# Patient Record
Sex: FEMALE | Race: WHITE | NOT HISPANIC OR LATINO | Employment: FULL TIME | URBAN - METROPOLITAN AREA
[De-identification: names, ages, dates, MRNs, and addresses within clinical notes are randomized per-mention and may not be internally consistent; named-entity substitution may affect disease eponyms.]

---

## 2017-05-16 DIAGNOSIS — M62.830 LUMBAR PARASPINAL MUSCLE SPASM: ICD-10-CM

## 2017-05-16 DIAGNOSIS — G47.00 INSOMNIA, UNSPECIFIED TYPE: ICD-10-CM

## 2017-05-16 NOTE — TELEPHONE ENCOUNTER
Cyclobenzaprine 10mg      Last Written Prescription Date: 03/03/2017  #90 x 0  Last filled 03/03/2017  Last Office Visit with [facility] or  Health prescribing provider: 09/20/2016 PERLA Shaikh    Zolpidem 5mg      Last Written Prescription Date:  03/03/2017  #30 x 0  Last filled 03/03/2017  Last Office Visit with [facility], [facility] or [facility] prescribing provider: 09/20/2016 PERLA Shaikh  Future Office visit:   none    Routing refill request to provider for review/approval because:  Drug not on the [facility], [facility] or [facility] refill protocol or controlled substance

## 2017-05-17 RX ORDER — ZOLPIDEM TARTRATE 5 MG/1
TABLET ORAL
Qty: 30 TABLET | Refills: 1 | Status: SHIPPED | OUTPATIENT
Start: 2017-05-17 | End: 2017-10-04

## 2017-05-17 RX ORDER — CYCLOBENZAPRINE HCL 10 MG
TABLET ORAL
Qty: 90 TABLET | Refills: 1 | Status: SHIPPED | OUTPATIENT
Start: 2017-05-17 | End: 2017-11-07

## 2017-09-27 ENCOUNTER — E-VISIT (OUTPATIENT)
Dept: FAMILY MEDICINE | Facility: CLINIC | Age: 47
End: 2017-09-27
Payer: COMMERCIAL

## 2017-09-27 DIAGNOSIS — M54.12 CERVICAL RADICULOPATHY: Primary | ICD-10-CM

## 2017-09-27 PROCEDURE — 99207 ZZC NO CHARGE NURSE ONLY: CPT | Performed by: FAMILY MEDICINE

## 2017-10-04 DIAGNOSIS — G47.00 INSOMNIA, UNSPECIFIED TYPE: ICD-10-CM

## 2017-10-05 RX ORDER — ZOLPIDEM TARTRATE 5 MG/1
TABLET ORAL
Qty: 30 TABLET | Refills: 0 | Status: SHIPPED | OUTPATIENT
Start: 2017-10-05 | End: 2018-01-05

## 2017-10-05 NOTE — TELEPHONE ENCOUNTER
Zolpidem 5mg      Last Written Prescription Date:  05/17/2017 #30 x 1  Last filled 09/02/2017  Last Office Visit with [facility] or  Health prescribing provider: 09/20/2016 PERLA Shaikh  Future Office visit:   none    Routing refill request to provider for review/approval because:  Drug not on the [facility] or  Health refill protocol or controlled substance

## 2017-10-23 ENCOUNTER — OFFICE VISIT (OUTPATIENT)
Dept: FAMILY MEDICINE | Facility: CLINIC | Age: 47
End: 2017-10-23
Payer: COMMERCIAL

## 2017-10-23 VITALS
HEIGHT: 64 IN | HEART RATE: 100 BPM | DIASTOLIC BLOOD PRESSURE: 88 MMHG | SYSTOLIC BLOOD PRESSURE: 124 MMHG | WEIGHT: 200.2 LBS | TEMPERATURE: 98.3 F | BODY MASS INDEX: 34.18 KG/M2

## 2017-10-23 DIAGNOSIS — G89.29 UPPER BACK PAIN, CHRONIC: ICD-10-CM

## 2017-10-23 DIAGNOSIS — R73.09 ABNORMAL GLUCOSE: ICD-10-CM

## 2017-10-23 DIAGNOSIS — M54.9 UPPER BACK PAIN, CHRONIC: ICD-10-CM

## 2017-10-23 DIAGNOSIS — F33.0 MAJOR DEPRESSIVE DISORDER, RECURRENT EPISODE, MILD (H): ICD-10-CM

## 2017-10-23 DIAGNOSIS — E78.5 HYPERLIPIDEMIA LDL GOAL <160: ICD-10-CM

## 2017-10-23 DIAGNOSIS — Z11.3 SCREEN FOR STD (SEXUALLY TRANSMITTED DISEASE): ICD-10-CM

## 2017-10-23 DIAGNOSIS — G56.01 CARPAL TUNNEL SYNDROME OF RIGHT WRIST: Primary | ICD-10-CM

## 2017-10-23 LAB — HBA1C MFR BLD: 5.3 % (ref 4.3–6)

## 2017-10-23 PROCEDURE — 87389 HIV-1 AG W/HIV-1&-2 AB AG IA: CPT | Performed by: FAMILY MEDICINE

## 2017-10-23 PROCEDURE — 99214 OFFICE O/P EST MOD 30 MIN: CPT | Performed by: FAMILY MEDICINE

## 2017-10-23 PROCEDURE — 83036 HEMOGLOBIN GLYCOSYLATED A1C: CPT | Performed by: FAMILY MEDICINE

## 2017-10-23 PROCEDURE — 87491 CHLMYD TRACH DNA AMP PROBE: CPT | Performed by: FAMILY MEDICINE

## 2017-10-23 PROCEDURE — 80053 COMPREHEN METABOLIC PANEL: CPT | Performed by: FAMILY MEDICINE

## 2017-10-23 PROCEDURE — 87591 N.GONORRHOEAE DNA AMP PROB: CPT | Performed by: FAMILY MEDICINE

## 2017-10-23 PROCEDURE — 36415 COLL VENOUS BLD VENIPUNCTURE: CPT | Performed by: FAMILY MEDICINE

## 2017-10-23 PROCEDURE — 80061 LIPID PANEL: CPT | Performed by: FAMILY MEDICINE

## 2017-10-23 PROCEDURE — 86780 TREPONEMA PALLIDUM: CPT | Performed by: FAMILY MEDICINE

## 2017-10-23 RX ORDER — BUPROPION HYDROCHLORIDE 300 MG/1
300 TABLET ORAL DAILY
Qty: 90 TABLET | Refills: 3 | Status: SHIPPED | OUTPATIENT
Start: 2017-10-23 | End: 2018-10-02

## 2017-10-23 ASSESSMENT — PATIENT HEALTH QUESTIONNAIRE - PHQ9: SUM OF ALL RESPONSES TO PHQ QUESTIONS 1-9: 8

## 2017-10-23 NOTE — NURSING NOTE
"Initial /88  Pulse 100  Temp 98.3  F (36.8  C) (Tympanic)  Ht 5' 4.25\" (1.632 m)  Wt 200 lb 3.2 oz (90.8 kg)  LMP 11/15/2013  Breastfeeding? No  BMI 34.1 kg/m2 Estimated body mass index is 34.1 kg/(m^2) as calculated from the following:    Height as of this encounter: 5' 4.25\" (1.632 m).    Weight as of this encounter: 200 lb 3.2 oz (90.8 kg). .      "

## 2017-10-23 NOTE — MR AVS SNAPSHOT
After Visit Summary   10/23/2017    Gloria Mehta    MRN: [id number]           Patient Information     Date Of Birth          1970        Visit Information        Provider Department      10/23/2017 1:00 PM Shirley Shaikh DO [facility]        Today's Diagnoses     Carpal tunnel syndrome of right wrist    -  1    Major depressive disorder, recurrent episode, mild (H)        Upper back pain, chronic        Hyperlipidemia LDL goal <160        Abnormal glucose        Screen for STD (sexually transmitted disease)          Care Instructions    Please call to schedule the EMG to evaluate for carpal tunnel when you are able to.  Based on results we can have you see hand surgery for treatment.    Please also schedule with physical therapy when you are able.              Follow-ups after your visit        Additional Services     [facility] PT, HAND, AND CHIROPRACTIC REFERRAL       **This order will print in the [facility] Scheduling Office**    Physical Therapy, Hand Therapy and Chiropractic Care are available through:    *[facility]  *[facility]  *[facility]    Call one number to schedule at any of the above locations: [phone].    Your provider has referred you to: Physical Therapy at [facility] or [facility]    Indication/Reason for Referral: upper back pain/shoulder  Onset of Illness: chronic  Therapy Orders: Evaluate and Treat  Special Programs: None  Special Request: None    Akanksha Vale      Additional Comments for the Therapist or Chiropractor:     Please be aware that coverage of these services is subject to the terms and limitations of your health insurance plan.  Call member services at your health plan with any benefit or coverage questions.      Please bring the following to your appointment:    *Your personal calendar for scheduling future appointments  *Comfortable clothing            NEUROLOGY ADULT REFERRAL       Your provider has  referred you for the following:   [place] [facility] [phone]   [email or web address]    Please be aware that coverage of these services is subject to the terms and limitations of your health insurance plan.  Call member services at your health plan with any benefit or coverage questions.      Please bring the following with you to your appointment:    (1) Any X-Rays, CTs or MRIs which have been performed.  Contact the facility where they were done to arrange for  prior to your scheduled appointment.    (2) List of current medications  (3) This referral request   (4) Any documents/labs given to you for this referral                  Who to contact     Normal or non-critical lab and imaging results will be communicated to you by [employer], letter or phone within 4 business days after the clinic has received the results. If you do not hear from us within 7 days, please contact the clinic through [employer] or phone. If you have a critical or abnormal lab result, we will notify you by phone as soon as possible.  Submit refill requests through [employer] or call your pharmacy and they will forward the refill request to us. Please allow 3 business days for your refill to be completed.          If you need to speak with a  for additional information , please call: [phone]           Additional Information About Your Visit        [employer] Information     [employer] gives you secure access to your electronic health record. If you see a primary care provider, you can also send messages to your care team and make appointments. If you have questions, please call your primary care clinic.  If you do not have a primary care provider, please call [phone] and they will assist you.        Care EveryWhere ID     This is your Care EveryWhere ID. This could be used by other organizations to access your [facility] medical records  [id number]        Your Vitals Were   "   Pulse Temperature Height Last Period Breastfeeding? BMI (Body Mass Index)    100 98.3  F (36.8  C) (Tympanic) 5' 4.25\" (1.632 m) 11/15/2013 No 34.1 kg/m2       Blood Pressure from Last 3 Encounters:   10/23/17 124/88   09/20/16 114/78   06/11/15 128/78    Weight from Last 3 Encounters:   10/23/17 200 lb 3.2 oz (90.8 kg)   09/20/16 202 lb 9.6 oz (91.9 kg)   06/11/15 193 lb (87.5 kg)              We Performed the Following     Anti Treponema     CHLAMYDIA TRACHOMATIS PCR     Comprehensive metabolic panel     Hemoglobin A1c     HIV Antigen Antibody Combo     [facility] PT, HAND, AND CHIROPRACTIC REFERRAL     Lipid panel reflex to direct LDL     NEISSERIA GONORRHOEA PCR     NEUROLOGY ADULT REFERRAL          Where to get your medicines      These medications were sent to [facility] [address] [place] [address] [address] [facility] [place] [address] [address] [employer] [address]     Phone:  [phone]     buPROPion 300 MG 24 hr tablet          Primary Care Provider Office Phone # Fax #    Shirley Ramonjordon Shaikh,  [phone] [address] [place] [address]        Equal Access to Services     DEXTER REHMAN AH: Hadii niki ku hadasho Soomaali, waaxda luqadaha, qaybta kaalmada adeegyada, waxay sabiin haymileyn juan cheek. So [facility] [phone].    ATENCIÓN: Si habla español, tiene a juan disposición servicios gratuitos de asistencia lingüística. Llame al [phone].    We comply with applicable federal civil rights laws and [place] laws. We do not discriminate on the basis of race, color, national origin, age, disability, sex, sexual orientation, or gender identity.            Thank you!     Thank you for choosing [facility] [place]  for your care. Our goal is always to provide you with excellent care. Hearing back from our patients is one way we can continue to improve our services. Please take a few minutes to complete the written survey that you may " receive in the mail after your visit with us. Thank you!             Your Updated Medication List - Protect others around you: Learn how to safely use, store and throw away your medicines at www.disposemymeds.org.          This list is accurate as of: 10/23/17  1:34 PM.  Always use your most recent med list.                   Brand Name Dispense Instructions for use Diagnosis    buPROPion 300 MG 24 hr tablet    WELLBUTRIN XL    90 tablet    Take 1 tablet (300 mg) by mouth daily    Major depressive disorder, recurrent episode, mild (H)       cyclobenzaprine 10 MG tablet    FLEXERIL    90 tablet    TAKE 1 TABLET(10 MG) BY MOUTH THREE TIMES DAILY AS NEEDED FOR MUSCLE SPASMS    Lumbar paraspinal muscle spasm       DULoxetine 30 MG EC capsule    CYMBALTA    180 capsule    Take 1 capsule (30 mg) by mouth 2 times daily    Tension headache       fluticasone 50 MCG/ACT spray    FLONASE    16 g    One spray each nostril daily    Allergic rhinitis due to pollen       ibuprofen 200 MG tablet    ADVIL/MOTRIN    100 tablet    Take 1-2 tablets (200-400 mg) by mouth every 6 hours as needed for other (mild pain)    Status post hysterectomy       simvastatin 20 MG tablet    ZOCOR    90 tablet    Take 1 tablet (20 mg) by mouth At Bedtime    Hyperlipidemia LDL goal <160       zolpidem 5 MG tablet    AMBIEN    30 tablet    TAKE 1 TABLET BY MOUTH NIGHTLY AS NEEDED FOR SLEEP    Insomnia, unspecified type

## 2017-10-23 NOTE — PROGRESS NOTES
SUBJECTIVE:   Gloria Mehta is a 47 year old female who presents to clinic today for the following health issues:    Musculoskeletal problem/pain      Duration: 1 month    Description  Location: bilateral shoulders and arms    Intensity:  moderate    Accompanying signs and symptoms: radiation of pain to arms and hands, numbness and tingling right hand    History  Previous similar problem: YES- intermittent shoulder pain for the last couple years  Previous evaluation:  none    Precipitating or alleviating factors:  Trauma or overuse: YES- no injury, but she types a lot at work  Aggravating factors include: overuse    Therapies tried and outcome: heat, ice, Ibuprofen and flexeril are not effective, wrist braces unsure if effective    Has pain in both arms and then pain/tingling in the R thumb.  With certain movements tingling can radiate up the arm as well.  This has been going on for a month    Feels tight/tense between her shoulders.  Pain the radiates from there into her arms aquiles, upper and into the forearm as well.  This has been going on intermittently for years.  Long time since her last course of therapy.  Has done massage and chiropractor which have helped    Does do a lot of typing at work.  Has been wearing wrist braces to bed and that is helping.  Does not resolve the tingling when she uses it but makes symptoms more comfortable for sleeping. vcan occasionally get tingling in the 2nd and 3rd digits of the R murray as well.  No symptoms into the L hand.    Had an EMG on the right 16 years ago which was normal at that time    *  requests STD screening today, no particular concerns.    *  Feels mood is stable.  Had run out of her wellbutrin, refilled today.  Struggling some with dating since she  her .  Feels things are going well but it can impact mood.  Overall feels things are stable and she is not interested in making any changes.    * has been taking her simvastatin regularly and was at the  time of her last blood draw.  Not fasting today but would be willing to recheck.        Problem list and histories reviewed & adjusted, as indicated.  Additional history: as documented      Reviewed and updated as needed this visit by clinical staffTobacco  Allergies  Meds  Problems  Med Hx  Surg Hx  Fam Hx  Soc Hx        Reviewed and updated as needed this visit by Provider  Tobacco  Allergies  Meds  Problems  Med Hx  Surg Hx  Fam Hx  Soc Hx        ROS: Remainder of Constitutional, CV, Respiratory, GI,  negative with exception of that mentioned above    PE:  VS as above   Gen:  WN/WD/WH female in NAD   Neck:  supple, no LAD appreciated   MSk:  Full pain free ROM of c-spine.  Full ROM of UE aquiles.  Muscle strength intact in UE aquiles at 5/5, sensation intact to light touch.  DTR's 2/4 at elbows aquiles   Psych: Alert and oriented times 3; coherent speech, normal   rate and volume, able to articulate logical thoughts, able   to abstract reason, no tangential thoughts, no hallucinations   or delusions  Her affect is bright and appropriate    A/P:      ICD-10-CM    1. Carpal tunnel syndrome of right wrist G56.01 NEUROLOGY ADULT REFERRAL   2. Upper back pain, chronic M54.9 [facility] PT, HAND, AND CHIROPRACTIC REFERRAL    G89.29    3. Major depressive disorder, recurrent episode, mild (H) F33.0 buPROPion (WELLBUTRIN XL) 300 MG 24 hr tablet   4. Hyperlipidemia LDL goal <160 E78.5 Lipid panel reflex to direct LDL     Comprehensive metabolic panel   5. Abnormal glucose R73.09 Comprehensive metabolic panel     Hemoglobin A1c   6. Screen for STD (sexually transmitted disease) Z11.3 HIV Antigen Antibody Combo     Anti Treponema     NEISSERIA GONORRHOEA PCR     CHLAMYDIA TRACHOMATIS PCR     Patient Instructions   Please call to schedule the EMG to evaluate for carpal tunnel when you are able to.  Based on results we can have you see hand surgery for treatment.    Please also schedule with physical therapy when you are  able.        meds refilled.  Will notify pt of lab results when available.  If LDL still elevated consider switch to atorvastatin.

## 2017-10-23 NOTE — LETTER
My Depression Action Plan  Name: Gloria Mehta   Date of Birth 1970  Date: 10/23/2017    My doctor: Shirley Shaikh   My clinic: [address] [id number] [phone]          GREEN    ZONE   Good Control    What it looks like:     Things are going generally well. You have normal up s and down s. You may even feel depressed from time to time, but bad moods usually last less than a day.   What you need to do:  1. Continue to care for yourself (see self care plan)  2. Check your depression survival kit and update it as needed  3. Follow your physician s recommendations including any medication.  4. Do not stop taking medication unless you consult with your physician first.           YELLOW         ZONE Getting Worse    What it looks like:     Depression is starting to interfere with your life.     It may be hard to get out of bed; you may be starting to isolate yourself from others.    Symptoms of depression are starting to last most all day and this has happened for several days.     You may have suicidal thoughts but they are not constant.   What you need to do:     1. Call your care team, your response to treatment will improve if you keep your care team informed of your progress. Yellow periods are signs an adjustment may need to be made.     2. Continue your self-care, even if you have to fake it!    3. Talk to someone in your support network    4. Open up your depression survival kit           RED    ZONE Medical Alert - Get Help    What it looks like:     Depression is seriously interfering with your life.     You may experience these or other symptoms: You can t get out of bed most days, can t work or engage in other necessary activities, you have trouble taking care of basic hygiene, or basic responsibilities, thoughts of suicide or death that will not go away, self-injurious behavior.     What you need to do:  1. Call your care team and  request a same-day appointment. If they are not available (weekends or after hours) call your local crisis line, emergency room or 911.      Electronically signed by: Kerrie Mirza, October 23, 2017    Depression Self Care Plan / Survival Kit    Self-Care for Depression  Here s the deal. Your body and mind are really not as separate as most people think.  What you do and think affects how you feel and how you feel influences what you do and think. This means if you do things that people who feel good do, it will help you feel better.  Sometimes this is all it takes.  There is also a place for medication and therapy depending on how severe your depression is, so be sure to consult with your medical provider and/ or Behavioral Health Consultant if your symptoms are worsening or not improving.     In order to better manage my stress, I will:    Exercise  Get some form of exercise, every day. This will help reduce pain and release endorphins, the  feel good  chemicals in your brain. This is almost as good as taking antidepressants!  This is not the same as joining a gym and then never going! (they count on that by the way ) It can be as simple as just going for a walk or doing some gardening, anything that will get you moving.      Hygiene   Maintain good hygiene (Get out of bed in the morning, Make your bed, Brush your teeth, Take a shower, and Get dressed like you were going to work, even if you are unemployed).  If your clothes don't fit try to get ones that do.    Diet  I will strive to eat foods that are good for me, drink plenty of water, and avoid excessive sugar, caffeine, alcohol, and other mood-altering substances.  Some foods that are helpful in depression are: complex carbohydrates, B vitamins, flaxseed, fish or fish oil, fresh fruits and vegetables.    Psychotherapy  I agree to participate in Individual Therapy (if recommended).    Medication  If prescribed medications, I agree to take them.  Missing doses  can result in serious side effects.  I understand that drinking alcohol, or other illicit drug use, may cause potential side effects.  I will not stop my medication abruptly without first discussing it with my provider.    Staying Connected With Others  I will stay in touch with my friends, family members, and my primary care provider/team.    Use your imagination  Be creative.  We all have a creative side; it doesn t matter if it s oil painting, sand castles, or mud pies! This will also kick up the endorphins.    Witness Beauty  (AKA stop and smell the roses) Take a look outside, even in mid-winter. Notice colors, textures. Watch the squirrels and birds.     Service to others  Be of service to others.  There is always someone else in need.  By helping others we can  get out of ourselves  and remember the really important things.  This also provides opportunities for practicing all the other parts of the program.    Humor  Laugh and be silly!  Adjust your TV habits for less news and crime-drama and more comedy.    Control your stress  Try breathing deep, massage therapy, biofeedback, and meditation. Find time to relax each day.     My support system    Clinic Contact:  Phone number:    Contact 1:  Phone number:    Contact 2:  Phone number:    Jain/:  Phone number:    Therapist:  Phone number:    Local crisis center:    Phone number:    Other community support:  Phone number:

## 2017-10-24 ENCOUNTER — MYC MEDICAL ADVICE (OUTPATIENT)
Dept: FAMILY MEDICINE | Facility: CLINIC | Age: 47
End: 2017-10-24

## 2017-10-24 LAB
ALBUMIN SERPL-MCNC: 4.2 G/DL (ref 3.4–5)
ALP SERPL-CCNC: 87 U/L (ref 40–150)
ALT SERPL W P-5'-P-CCNC: 39 U/L (ref 0–50)
ANION GAP SERPL CALCULATED.3IONS-SCNC: 9 MMOL/L (ref 3–14)
AST SERPL W P-5'-P-CCNC: 22 U/L (ref 0–45)
BILIRUB SERPL-MCNC: 0.2 MG/DL (ref 0.2–1.3)
BUN SERPL-MCNC: 14 MG/DL (ref 7–30)
C TRACH DNA SPEC QL NAA+PROBE: NEGATIVE
CALCIUM SERPL-MCNC: 8.9 MG/DL (ref 8.5–10.1)
CHLORIDE SERPL-SCNC: 103 MMOL/L (ref 94–109)
CHOLEST SERPL-MCNC: 243 MG/DL
CO2 SERPL-SCNC: 23 MMOL/L (ref 20–32)
CREAT SERPL-MCNC: 0.75 MG/DL (ref 0.52–1.04)
GFR SERPL CREATININE-BSD FRML MDRD: 83 ML/MIN/1.7M2
GLUCOSE SERPL-MCNC: 99 MG/DL (ref 70–99)
HDLC SERPL-MCNC: 53 MG/DL
HIV 1+2 AB+HIV1 P24 AG SERPL QL IA: NONREACTIVE
LDLC SERPL CALC-MCNC: 148 MG/DL
N GONORRHOEA DNA SPEC QL NAA+PROBE: NEGATIVE
NONHDLC SERPL-MCNC: 190 MG/DL
POTASSIUM SERPL-SCNC: 3.8 MMOL/L (ref 3.4–5.3)
PROT SERPL-MCNC: 8 G/DL (ref 6.8–8.8)
SODIUM SERPL-SCNC: 135 MMOL/L (ref 133–144)
SPECIMEN SOURCE: NORMAL
SPECIMEN SOURCE: NORMAL
T PALLIDUM IGG+IGM SER QL: NEGATIVE
TRIGL SERPL-MCNC: 208 MG/DL

## 2017-10-26 DIAGNOSIS — E78.5 HYPERLIPIDEMIA LDL GOAL <160: Primary | ICD-10-CM

## 2017-10-26 RX ORDER — SIMVASTATIN 40 MG
40 TABLET ORAL AT BEDTIME
Qty: 90 TABLET | Refills: 0 | Status: SHIPPED | OUTPATIENT
Start: 2017-10-26 | End: 2018-10-26

## 2017-11-02 ENCOUNTER — MYC MEDICAL ADVICE (OUTPATIENT)
Dept: FAMILY MEDICINE | Facility: CLINIC | Age: 47
End: 2017-11-02

## 2017-11-02 ENCOUNTER — OFFICE VISIT (OUTPATIENT)
Dept: NEUROLOGY | Facility: CLINIC | Age: 47
End: 2017-11-02
Payer: COMMERCIAL

## 2017-11-02 DIAGNOSIS — G56.01 CARPAL TUNNEL SYNDROME OF RIGHT WRIST: Primary | ICD-10-CM

## 2017-11-02 DIAGNOSIS — R20.2 RIGHT HAND PARESTHESIA: Primary | ICD-10-CM

## 2017-11-02 PROCEDURE — 95910 NRV CNDJ TEST 7-8 STUDIES: CPT | Performed by: PSYCHIATRY & NEUROLOGY

## 2017-11-02 PROCEDURE — 95886 MUSC TEST DONE W/N TEST COMP: CPT | Performed by: PSYCHIATRY & NEUROLOGY

## 2017-11-02 NOTE — MR AVS SNAPSHOT
After Visit Summary   11/2/2017    Gloria Mehta    MRN: [id number]           Patient Information     Date Of Birth          1970        Visit Information        Provider Department      11/2/2017 8:00 AM Maxwell Ch MD [facility]        Today's Diagnoses     Right hand paresthesia    -  1      Care Instructions    Electromyography Test Referral Only    Advised to follow-up with referring provider Shirley Shaikh DO to review the test results and further management.    Thanks                    Follow-ups after your visit        Follow-up notes from your care team     Return if symptoms worsen or fail to improve, for EMG.      Who to contact     If you have questions or need follow up information about today's clinic visit or your schedule please contact [facility] directly at [phone].  Normal or non-critical lab and imaging results will be communicated to you by MyChart, letter or phone within 4 business days after the clinic has received the results. If you do not hear from us within 7 days, please contact the clinic through MyChart or phone. If you have a critical or abnormal lab result, we will notify you by phone as soon as possible.  Submit refill requests through [employer] or call your pharmacy and they will forward the refill request to us. Please allow 3 business days for your refill to be completed.          Additional Information About Your Visit        MyChart Information     [employer] gives you secure access to your electronic health record. If you see a primary care provider, you can also send messages to your care team and make appointments. If you have questions, please call your primary care clinic.  If you do not have a primary care provider, please call [phone] and they will assist you.        Care EveryWhere ID     This is your Care EveryWhere ID. This could be used by other organizations to access your [facility]  "records  [id number]        Your Vitals Were     Height Last Period                1.632 m (5' 4.25\") 11/15/2013           Blood Pressure from Last 3 Encounters:   10/23/17 124/88   09/20/16 114/78   06/11/15 128/78    Weight from Last 3 Encounters:   10/23/17 90.8 kg (200 lb 3.2 oz)   09/20/16 91.9 kg (202 lb 9.6 oz)   06/11/15 87.5 kg (193 lb)              We Performed the Following     HC NCS MOTOR W OR W/O F-WAVE, 7 OR 8     HC NEEDLE EMG EA EXTREMTY W/PARASPINAL AREA COMPLETE        Primary Care Provider Office Phone # Fax #    Shirley Smith Neel,  [phone] [address] [place] [address]        Equal Access to Services     CEZAR REHMAN : Hadii aad sejal hadasho Soomaali, waaxda luqadaha, qaybta kaalmada adeegyada, kai landry haycassie jarrell . So [facility] [phone].    ATENCIÓN: Si habla español, tiene a juan disposición servicios gratuitos de asistencia lingüística. Jeff al [phone].    We comply with applicable federal civil rights laws and [place] laws. We do not discriminate on the basis of race, color, national origin, age, disability, sex, sexual orientation, or gender identity.            Thank you!     Thank you for choosing [facility]  for your care. Our goal is always to provide you with excellent care. Hearing back from our patients is one way we can continue to improve our services. Please take a few minutes to complete the written survey that you may receive in the mail after your visit with us. Thank you!             Your Updated Medication List - Protect others around you: Learn how to safely use, store and throw away your medicines at www.disposemymeds.org.          This list is accurate as of: 11/2/17  9:21 AM.  Always use your most recent med list.                   Brand Name Dispense Instructions for use Diagnosis    buPROPion 300 MG 24 hr tablet    WELLBUTRIN XL    90 tablet    Take 1 tablet (300 mg) by mouth daily    Major " depressive disorder, recurrent episode, mild (H)       cyclobenzaprine 10 MG tablet    FLEXERIL    90 tablet    TAKE 1 TABLET(10 MG) BY MOUTH THREE TIMES DAILY AS NEEDED FOR MUSCLE SPASMS    Lumbar paraspinal muscle spasm       DULoxetine 30 MG EC capsule    CYMBALTA    180 capsule    Take 1 capsule (30 mg) by mouth 2 times daily    Tension headache       fluticasone 50 MCG/ACT spray    FLONASE    16 g    One spray each nostril daily    Allergic rhinitis due to pollen       ibuprofen 200 MG tablet    ADVIL/MOTRIN    100 tablet    Take 1-2 tablets (200-400 mg) by mouth every 6 hours as needed for other (mild pain)    Status post hysterectomy       * simvastatin 20 MG tablet    ZOCOR    90 tablet    Take 1 tablet (20 mg) by mouth At Bedtime    Hyperlipidemia LDL goal <160       * simvastatin 40 MG tablet    ZOCOR    90 tablet    Take 1 tablet (40 mg) by mouth At Bedtime    Hyperlipidemia LDL goal <160       zolpidem 5 MG tablet    AMBIEN    30 tablet    TAKE 1 TABLET BY MOUTH NIGHTLY AS NEEDED FOR SLEEP    Insomnia, unspecified type       * Notice:  This list has 2 medication(s) that are the same as other medications prescribed for you. Read the directions carefully, and ask your doctor or other care provider to review them with you.

## 2017-11-02 NOTE — LETTER
2017         RE: Gloria Mehta  [address]    [facility] [address] [address]        Dear Colleague,    Thank you for referring your patient, Gloria Mehta, to the [facility]. Please see a copy of my visit note below.      ELECTRODIAGNOSTIC LABORATORY REPORT    DATE OF VISIT:  2017  LOCATION: [facility]  MRN: [id number]  NAME: Ms. Gloria Mehta  : 1970 (47 year old)  REFERRING/PRIMARY CARE PROVIDER: Shirley Shaikh DO      REASON FOR TEST: Right hand paresthesias    CLINICAL DATA: 47-year-old woman with the right hand paresthesias of one month duration. Paresthesias predominantly affected digit 1 but she adds that digits 2 and 3 are also affected. These hand paresthesias tend to radiate up to her right elbow. Denies radiating any wrist band or wrist watch on the right side. Denies any history of nocturnal acroparesthesias but she has been using wrist splint. Shaking of the hand does not help with her relieving the paresthesias. Denies any neck pain but complains of stiffness in the posterior neck. No history of cervical spine surgery. No history of diabetes mellitus and/or hypo-thyroidism.  Limited neuromuscular examination essentially normal.    PERTINENT FINDINGS:  Sensory & Mixed Nerve Studies:  1. Right Median-D2 & D3 distal sensory peak latencies normal  2. Right Median versus ulnar mixed nerve palmar studies normal  3. Right Ulnar nerve (digit 5) sensory study normal  4. Right Radial sensory response normal    Motor Nerves:  1. Right Median-APB distal motor latency normal  2. Right Ulnar motor study normal    Limited NEEDLE EMG of Right upper limb muscles normal. The additional muscles were not done because she developed nausea and feeling very hot.    IMPRESSION: Normal study. There is no electrophysiological evidence of focal entrapment neuropathy of right upper limb. In particular, no electrophysiological evidence of median entrapment neuropathy at  right wrist (Carpal tunnel syndrome). No suggestion of cervical radiculopathy based on this limited needle EMG examination.    COMMENTS: A minority of patients can have clinical symptoms suggestive of carpal tunnel syndrome in presence of normal studies. Therefore, if symptoms persist, strong consideration be/will be given to repeat the studies in 4-6 months time or earlier if needed.    Ms. Gloria Mehta plans to follow-up with Shirley Shaikh DO to review test results and plan of management.        Thanks to Shirley Shaikh DO for allowing me to participate in the Ms. Mehta's care. Please feel free to call me with any questions or concerns.     CC: Shirley Shaikh DO                            Again, thank you for allowing me to participate in the care of your patient.        Sincerely,        Maxwell Ch MD

## 2017-11-02 NOTE — PATIENT INSTRUCTIONS
Electromyography Test Referral Only    Advised to follow-up with referring provider Shirley Shaikh DO to review the test results and further management.    Thanks

## 2017-11-02 NOTE — NURSING NOTE
"Chief Complaint   Patient presents with     Procedure     EMG right hand       Initial LMP 11/15/2013 Estimated body mass index is 34.1 kg/(m^2) as calculated from the following:    Height as of 10/23/17: 1.632 m (5' 4.25\").    Weight as of 10/23/17: 90.8 kg (200 lb 3.2 oz).  Medication Reconciliation: unable or not appropriate to perform   Ian Ma MA      "

## 2017-11-02 NOTE — PROGRESS NOTES
ELECTRODIAGNOSTIC LABORATORY REPORT    DATE OF VISIT:  2017  LOCATION: [facility]  MRN: [id number]  NAME: Ms. Gloria Mehta  : 1970 (47 year old)  REFERRING/PRIMARY CARE PROVIDER: Shirley Shaikh DO      REASON FOR TEST: Right hand paresthesias    CLINICAL DATA: 47-year-old woman with the right hand paresthesias of one month duration. Paresthesias predominantly affected digit 1 but she adds that digits 2 and 3 are also affected. These hand paresthesias tend to radiate up to her right elbow. Denies radiating any wrist band or wrist watch on the right side. Denies any history of nocturnal acroparesthesias but she has been using wrist splint. Shaking of the hand does not help with her relieving the paresthesias. Denies any neck pain but complains of stiffness in the posterior neck. No history of cervical spine surgery. No history of diabetes mellitus and/or hypo-thyroidism.  Limited neuromuscular examination essentially normal.    PERTINENT FINDINGS:  Sensory & Mixed Nerve Studies:  1. Right Median-D2 & D3 distal sensory peak latencies normal  2. Right Median versus ulnar mixed nerve palmar studies normal  3. Right Ulnar nerve (digit 5) sensory study normal  4. Right Radial sensory response normal    Motor Nerves:  1. Right Median-APB distal motor latency normal  2. Right Ulnar motor study normal    Limited NEEDLE EMG of Right upper limb muscles normal. The additional muscles were not done because she developed nausea and feeling very hot.    IMPRESSION: Normal study. There is no electrophysiological evidence of focal entrapment neuropathy of right upper limb. In particular, no electrophysiological evidence of median entrapment neuropathy at right wrist (Carpal tunnel syndrome). No suggestion of cervical radiculopathy based on this limited needle EMG examination.    COMMENTS: A minority of patients can have clinical symptoms suggestive of carpal tunnel syndrome in presence of normal studies.  Therefore, if symptoms persist, strong consideration be/will be given to repeat the studies in 4-6 months time or earlier if needed.    Ms. Gloria Mehta plans to follow-up with Shirley Shaikh DO to review test results and plan of management.        Thanks to Shirley Shaikh DO for allowing me to participate in the Ms. Mehta's care. Please feel free to call me with any questions or concerns.     CC: Shirley Shaikh DO

## 2017-11-07 DIAGNOSIS — M62.830 LUMBAR PARASPINAL MUSCLE SPASM: ICD-10-CM

## 2017-11-07 NOTE — TELEPHONE ENCOUNTER
Cyclobenzaprine 10mg      Last Written Prescription Date: 05/17/2017  #90 x 1  Last office 09/02/2017  Last Office Visit with [facility], [facility] or [facility] prescribing provider: 10/23/2017 PERLA Shaikh

## 2017-11-08 NOTE — TELEPHONE ENCOUNTER
Routing refill request to provider for review/approval because:  Drug not on the [facility] refill protocol Cecilia Cohen RN

## 2017-11-09 RX ORDER — CYCLOBENZAPRINE HCL 10 MG
TABLET ORAL
Qty: 90 TABLET | Refills: 0 | Status: SHIPPED | OUTPATIENT
Start: 2017-11-09 | End: 2018-02-21

## 2018-01-05 DIAGNOSIS — G47.00 INSOMNIA, UNSPECIFIED TYPE: ICD-10-CM

## 2018-01-05 RX ORDER — ZOLPIDEM TARTRATE 5 MG/1
TABLET ORAL
Qty: 30 TABLET | Refills: 0 | Status: SHIPPED | OUTPATIENT
Start: 2018-01-05 | End: 2018-02-21

## 2018-01-05 NOTE — TELEPHONE ENCOUNTER
Routing refill request to provider for review/approval because:  Drug not on the [facility] refill protocol     Debbei Zamorano RN

## 2018-01-05 NOTE — TELEPHONE ENCOUNTER
Zolpidem 5mg      Last Written Prescription Date:  10/05/2047 #30 x 0  Last filled 10/05/2017  Last Office Visit: 10/23/2017 PERLA Shaikh  Future Office visit:   none    Routing refill request to provider for review/approval because:  Drug not on the [facility], [facility] or [facility] refill protocol or controlled substance

## 2018-02-21 DIAGNOSIS — M62.830 LUMBAR PARASPINAL MUSCLE SPASM: ICD-10-CM

## 2018-02-21 DIAGNOSIS — G47.00 INSOMNIA, UNSPECIFIED TYPE: ICD-10-CM

## 2018-02-22 RX ORDER — CYCLOBENZAPRINE HCL 10 MG
TABLET ORAL
Qty: 90 TABLET | Refills: 0 | Status: SHIPPED | OUTPATIENT
Start: 2018-02-22 | End: 2018-06-06

## 2018-02-22 RX ORDER — ZOLPIDEM TARTRATE 5 MG/1
TABLET ORAL
Qty: 30 TABLET | Refills: 0 | Status: SHIPPED | OUTPATIENT
Start: 2018-02-22 | End: 2018-06-06

## 2018-02-22 NOTE — TELEPHONE ENCOUNTER
Routing refill request to provider for review/approval because:  Drug not on the [facility] refill protocol     Cristy Murray RN

## 2018-03-15 DIAGNOSIS — E78.5 HYPERLIPIDEMIA LDL GOAL <160: ICD-10-CM

## 2018-03-15 RX ORDER — SIMVASTATIN 20 MG
40 TABLET ORAL AT BEDTIME
Qty: 60 TABLET | Refills: 0 | Status: SHIPPED | OUTPATIENT
Start: 2018-03-15 | End: 2018-04-18

## 2018-03-15 NOTE — TELEPHONE ENCOUNTER
Last Written Prescription Date:  10/26/17  Last Fill Quantity: 90,  # refills: 0   Last office visit: 10/23/2017 with prescribing provider:  Neel   Future Office Visit:

## 2018-03-15 NOTE — TELEPHONE ENCOUNTER
Medication is being filled for 1 time refill only due to:  Patient needs labs lipids. Future labs ordered yes.   Max Quintanilla RN

## 2018-03-27 DIAGNOSIS — J30.1 ALLERGIC RHINITIS DUE TO POLLEN: ICD-10-CM

## 2018-03-27 DIAGNOSIS — G44.209 TENSION HEADACHE: ICD-10-CM

## 2018-03-27 NOTE — TELEPHONE ENCOUNTER
"Requested Prescriptions   Pending Prescriptions Disp Refills     DULoxetine (CYMBALTA) 30 MG EC capsule [Pharmacy Med Name: DULOXETINE DR 30MG CAPSULES] 180 capsule 0    Last Written Prescription Date:  03/03/2017 #180 x 3  Last filled 01/08/2018  Last office visit: 10/23/2017 PERLA Shaikh   Future Office Visit:  None   Sig: TAKE ONE CAPSULE BY MOUTH TWICE DAILY    Serotonin-Norepinephrine Reuptake Inhibitors  Passed    3/27/2018  9:43 AM       Passed - Blood pressure under 140/90 in past 12 months    BP Readings from Last 3 Encounters:   10/23/17 124/88   09/20/16 114/78   06/11/15 128/78                Passed - Recent (12 mo) or future (30 days) visit within the authorizing provider's specialty    Patient had office visit in the last 12 months or has a visit in the next 30 days with authorizing provider or within the authorizing provider's specialty.  See \"Patient Info\" tab in inbasket, or \"Choose Columns\" in Meds & Orders section of the refill encounter.           Passed - Patient is age 18 or older       Passed - No active pregnancy on record       Passed - No positive pregnancy test in past 12 months          "

## 2018-03-27 NOTE — TELEPHONE ENCOUNTER
"Requested Prescriptions   Pending Prescriptions Disp Refills     fluticasone (FLONASE) 50 MCG/ACT spray [Pharmacy Med Name: FLUTICASONE 50MCG RAVIN SP (120SP) RX] 16 mL 0    Last Written Prescription Date:  08/22/201+6 #16g x 11  Last filled 03/01/2017  Last office visit: 10/23/2017 PERLA Shaikh   Future Office Visit:  None   Sig: SHAKE LIQUID AND USE 1 SPRAY IN EACH NOSTRIL DAILY    Inhaled Steroids Protocol Passed    3/27/2018  9:43 AM       Passed - Patient is age 12 or older       Passed - Recent (12 mo) or future (30 days) visit within the authorizing provider's specialty    Patient had office visit in the last 12 months or has a visit in the next 30 days with authorizing provider or within the authorizing provider's specialty.  See \"Patient Info\" tab in inbasket, or \"Choose Columns\" in Meds & Orders section of the refill encounter.              "

## 2018-03-28 RX ORDER — FLUTICASONE PROPIONATE 50 MCG
SPRAY, SUSPENSION (ML) NASAL
Qty: 16 ML | Refills: 2 | Status: SHIPPED | OUTPATIENT
Start: 2018-03-28 | End: 2019-06-26

## 2018-03-28 NOTE — TELEPHONE ENCOUNTER
Prescription approved per [facility] Refill Protocol or patient Primary care provider (PCP)  BRENTON Adame RN/Emil Corona

## 2018-03-30 RX ORDER — DULOXETIN HYDROCHLORIDE 30 MG/1
CAPSULE, DELAYED RELEASE ORAL
Qty: 180 CAPSULE | Refills: 0 | Status: SHIPPED | OUTPATIENT
Start: 2018-03-30 | End: 2018-06-28

## 2018-03-30 NOTE — TELEPHONE ENCOUNTER
Prescription approved per [facility] Refill Protocol.  Cecilia Cohen RN

## 2018-04-18 DIAGNOSIS — E78.5 HYPERLIPIDEMIA LDL GOAL <160: ICD-10-CM

## 2018-04-18 NOTE — TELEPHONE ENCOUNTER
"Requested Prescriptions   Pending Prescriptions Disp Refills     simvastatin (ZOCOR) 20 MG tablet [Pharmacy Med Name: SIMVASTATIN 20MG TABLETS] 60 tablet 0    Last Written Prescription Date:  3/15/18  Last Fill Quantity: 60,  # refills: 0   Last office visit: 10/23/2017 with prescribing provider:  10/23/17   Future Office Visit:     Sig: TAKE TWO TABLETS BY MOUTH AT BEDTIME    Statins Protocol Passed    4/18/2018 12:26 PM       Passed - LDL on file in past 12 months    Recent Labs   Lab Test  10/23/17   1337   LDL  148*            Passed - No abnormal creatine kinase in past 12 months    Recent Labs   Lab Test  10/16/12   0951   CKT  134               Passed - Recent (12 mo) or future (30 days) visit within the authorizing provider's specialty    Patient had office visit in the last 12 months or has a visit in the next 30 days with authorizing provider or within the authorizing provider's specialty.  See \"Patient Info\" tab in inbasket, or \"Choose Columns\" in Meds & Orders section of the refill encounter.           Passed - Patient is age 18 or older       Passed - No active pregnancy on record       Passed - No positive pregnancy test in past 12 months          "

## 2018-04-19 RX ORDER — SIMVASTATIN 20 MG
TABLET ORAL
Qty: 180 TABLET | Refills: 0 | Status: SHIPPED | OUTPATIENT
Start: 2018-04-19 | End: 2018-10-26 | Stop reason: DRUGHIGH

## 2018-04-19 NOTE — TELEPHONE ENCOUNTER
Routing refill request to provider for review/approval because:  Meaghan given x1 and patient did not follow up Cecilia Cohen RN

## 2018-06-06 DIAGNOSIS — M62.830 LUMBAR PARASPINAL MUSCLE SPASM: ICD-10-CM

## 2018-06-06 DIAGNOSIS — G47.00 INSOMNIA, UNSPECIFIED TYPE: ICD-10-CM

## 2018-06-06 NOTE — TELEPHONE ENCOUNTER
Requested Prescriptions   Pending Prescriptions Disp Refills     zolpidem (AMBIEN) 5 MG tablet [Pharmacy Med Name: ZOLPIDEM 5MG TABLETS]  Last Written Prescription Date:  2/22/18  Last Fill Quantity: 30,  # refills: 0   Last office visit: 10/23/2017 with prescribing provider:  10/23/17   Future Office Visit:     30 tablet 0     Sig: TAKE 1 TABLET BY MOUTH EVERY DAY AT BEDTIME AS NEEDED FOR SLEEP    There is no refill protocol information for this order        cyclobenzaprine (FLEXERIL) 10 MG tablet [Pharmacy Med Name: CYCLOBENZAPRINE 10MG TABLETS]  Last Written Prescription Date:  2/22/18  Last Fill Quantity: 90,  # refills: 0   Last office visit: 10/23/2017 with prescribing provider:  10/23/17   Future Office Visit:     90 tablet 0     Sig: TAKE 1 TABLET(10 MG) BY MOUTH THREE TIMES DAILY AS NEEDED FOR MUSCLE SPASMS    There is no refill protocol information for this order

## 2018-06-07 RX ORDER — ZOLPIDEM TARTRATE 5 MG/1
TABLET ORAL
Qty: 30 TABLET | Refills: 0 | Status: SHIPPED | OUTPATIENT
Start: 2018-06-07 | End: 2018-08-02

## 2018-06-07 RX ORDER — CYCLOBENZAPRINE HCL 10 MG
TABLET ORAL
Qty: 90 TABLET | Refills: 0 | Status: SHIPPED | OUTPATIENT
Start: 2018-06-07 | End: 2018-10-02

## 2018-06-28 DIAGNOSIS — G44.209 TENSION HEADACHE: ICD-10-CM

## 2018-06-28 RX ORDER — DULOXETIN HYDROCHLORIDE 30 MG/1
CAPSULE, DELAYED RELEASE ORAL
Qty: 180 CAPSULE | Refills: 0 | Status: SHIPPED | OUTPATIENT
Start: 2018-06-28 | End: 2018-10-02

## 2018-06-28 NOTE — TELEPHONE ENCOUNTER
"Requested Prescriptions   Pending Prescriptions Disp Refills     DULoxetine (CYMBALTA) 30 MG EC capsule [Pharmacy Med Name: DULOXETINE DR 30MG CAPSULES] 180 capsule 0     Sig: TAKE 1 CAPSULE BY MOUTH TWICE DAILY    Serotonin-Norepinephrine Reuptake Inhibitors  Passed    6/28/2018  9:49 AM       Passed - Blood pressure under 140/90 in past 12 months    BP Readings from Last 3 Encounters:   10/23/17 124/88   09/20/16 114/78   06/11/15 128/78                Passed - Recent (12 mo) or future (30 days) visit within the authorizing provider's specialty    Patient had office visit in the last 12 months or has a visit in the next 30 days with authorizing provider or within the authorizing provider's specialty.  See \"Patient Info\" tab in inbasket, or \"Choose Columns\" in Meds & Orders section of the refill encounter.           Passed - Patient is age 18 or older       Passed - No active pregnancy on record       Passed - No positive pregnancy test in past 12 months        Last Written Prescription Date:  3/30/18  Last Fill Quantity: 180,  # refills: 0   Last office visit: 10/23/2017 with prescribing provider:  CAROLYN   Future Office Visit:      "

## 2018-06-28 NOTE — TELEPHONE ENCOUNTER
Prescription approved per [facility] Refill Protocol.  Cecilia Cohen RN

## 2018-08-02 DIAGNOSIS — G47.00 INSOMNIA, UNSPECIFIED TYPE: ICD-10-CM

## 2018-08-02 RX ORDER — ZOLPIDEM TARTRATE 5 MG/1
TABLET ORAL
Qty: 30 TABLET | Refills: 0 | Status: SHIPPED | OUTPATIENT
Start: 2018-08-02 | End: 2018-10-26

## 2018-08-02 NOTE — TELEPHONE ENCOUNTER
Zolpidem 5mg      Last Written Prescription Date:  06/07/2018 #30 x 0  Last filled 06/07/2018  Last Office Visit: 10/23/2017 PERLA Shaikh  Future Office visit:   None    Routing refill request to provider for review/approval because:  Drug not on the [facility], [facility] or [facility] refill protocol or controlled substance

## 2018-10-02 DIAGNOSIS — G44.209 TENSION HEADACHE: ICD-10-CM

## 2018-10-02 DIAGNOSIS — M62.830 LUMBAR PARASPINAL MUSCLE SPASM: ICD-10-CM

## 2018-10-02 DIAGNOSIS — F33.0 MAJOR DEPRESSIVE DISORDER, RECURRENT EPISODE, MILD (H): ICD-10-CM

## 2018-10-02 RX ORDER — DULOXETIN HYDROCHLORIDE 30 MG/1
CAPSULE, DELAYED RELEASE ORAL
Qty: 180 CAPSULE | Refills: 0 | Status: SHIPPED | OUTPATIENT
Start: 2018-10-02 | End: 2018-10-26

## 2018-10-02 RX ORDER — CYCLOBENZAPRINE HCL 10 MG
TABLET ORAL
Qty: 90 TABLET | Refills: 0 | Status: SHIPPED | OUTPATIENT
Start: 2018-10-02 | End: 2019-01-02

## 2018-10-02 RX ORDER — BUPROPION HYDROCHLORIDE 300 MG/1
TABLET ORAL
Qty: 90 TABLET | Refills: 0 | Status: SHIPPED | OUTPATIENT
Start: 2018-10-02 | End: 2018-10-26

## 2018-10-02 NOTE — TELEPHONE ENCOUNTER
Routing refill request to provider for review/approval because:  Due for surveys. Sent to patient via My Chart. Last one done in 2017 was 8. Cecilia Cohen RN

## 2018-10-02 NOTE — TELEPHONE ENCOUNTER
"Requested Prescriptions   Pending Prescriptions Disp Refills     DULoxetine (CYMBALTA) 30 MG EC capsule [Pharmacy Med Name: DULOXETINE DR 30MG CAPSULES] 180 capsule 0    Last Written Prescription Date:  06/28/2018 #180 x 0  Last filled 06/28/2018  Last office visit: 10/23/2017 PERLA Shaikh   Future Office Visit:  None   Sig: TAKE 1 CAPSULE BY MOUTH TWICE DAILY    Serotonin-Norepinephrine Reuptake Inhibitors  Failed    10/2/2018  8:47 AM       Failed - PHQ-9 score of less than 5 in past 6 months    Please review last PHQ-9 score.   PHQ-9 SCORE 6/11/2015 9/2/2016 10/23/2017   Total Score 8 - -   Total Score MyChart - 7 (Mild depression) -   Total Score - - 8       NORMAN-7 SCORE 12/18/2014   Total Score 4                Failed - Recent (6 mo) or future (30 days) visit within the authorizing provider's specialty    Patient had office visit in the last 6 months or has a visit in the next 30 days with authorizing provider or within the authorizing provider's specialty.  See \"Patient Info\" tab in inbasket, or \"Choose Columns\" in Meds & Orders section of the refill encounter.           Passed - Blood pressure under 140/90 in past 12 months    BP Readings from Last 3 Encounters:   10/23/17 124/88   09/20/16 114/78   06/11/15 128/78                Passed - Patient is age 18 or older       Passed - No active pregnancy on record       Passed - No positive pregnancy test in past 12 months        buPROPion (WELLBUTRIN XL) 300 MG 24 hr tablet [Pharmacy Med Name: BUPROPION XL 300MG TABLETS] 90 tablet 0    Last Written Prescription Date:  10/23/2017 #90 x 3  Last filled 06/28/2018  Last office visit: 10/23/2017 PERLA Shaikh   Future Office Visit: None   Sig: TAKE 1 TABLET(300 MG) BY MOUTH DAILY    SSRIs Protocol Failed    10/2/2018  8:47 AM       Failed - PHQ-9 score less than 5 in past 6 months    Please review last PHQ-9 score.   PHQ-9 SCORE 6/11/2015 9/2/2016 10/23/2017   Total Score 8 - -   Total Score MyChart - 7 (Mild depression) - " "  Total Score - - 8       NORMAN-7 SCORE 12/18/2014   Total Score 4                Failed - Recent (6 mo) or future (30 days) visit within the authorizing provider's specialty    Patient had office visit in the last 6 months or has a visit in the next 30 days with authorizing provider or within the authorizing provider's specialty.  See \"Patient Info\" tab in inbasket, or \"Choose Columns\" in Meds & Orders section of the refill encounter.           Passed - Medication is Bupropion    If the medication is Bupropion (Wellbutrin), and the patient is taking for smoking cessation; OK to refill.         Passed - Patient is age 18 or older       Passed - No active pregnancy on record       Passed - No positive pregnancy test in last 12 months          "

## 2018-10-02 NOTE — TELEPHONE ENCOUNTER
Routing refill request to provider for review/approval because:  Patient needs to be seen because:  Last OV 10/23/17  [employer] message sent to pt to schedule OV.    Zoila BRUNO RN

## 2018-10-02 NOTE — TELEPHONE ENCOUNTER
Requested Prescriptions   Pending Prescriptions Disp Refills     cyclobenzaprine (FLEXERIL) 10 MG tablet [Pharmacy Med Name: CYCLOBENZAPRINE 10MG TABLETS]  Last Written Prescription Date:  06/07/2018 #90 x 0  Last filled 06/07/2018  Last office visit: 10/23/2017 PERLA Shaikh  Future Office Visit:  None   90 tablet 0     Sig: TAKE 1 TABLET(10 MG) BY MOUTH THREE TIMES DAILY AS NEEDED FOR MUSCLE SPASMS    There is no refill protocol information for this order

## 2018-10-26 ENCOUNTER — OFFICE VISIT (OUTPATIENT)
Dept: FAMILY MEDICINE | Facility: CLINIC | Age: 48
End: 2018-10-26
Payer: COMMERCIAL

## 2018-10-26 VITALS
HEART RATE: 112 BPM | WEIGHT: 193 LBS | TEMPERATURE: 98.8 F | DIASTOLIC BLOOD PRESSURE: 88 MMHG | SYSTOLIC BLOOD PRESSURE: 130 MMHG | BODY MASS INDEX: 32.15 KG/M2 | HEIGHT: 65 IN

## 2018-10-26 DIAGNOSIS — Z00.00 ENCOUNTER FOR ROUTINE ADULT HEALTH EXAMINATION WITHOUT ABNORMAL FINDINGS: Primary | ICD-10-CM

## 2018-10-26 DIAGNOSIS — Z12.31 ENCOUNTER FOR SCREENING MAMMOGRAM FOR BREAST CANCER: ICD-10-CM

## 2018-10-26 DIAGNOSIS — Z86.32 HISTORY OF GESTATIONAL DIABETES: ICD-10-CM

## 2018-10-26 DIAGNOSIS — G44.209 TENSION HEADACHE: ICD-10-CM

## 2018-10-26 DIAGNOSIS — E78.5 HYPERLIPIDEMIA LDL GOAL <160: ICD-10-CM

## 2018-10-26 DIAGNOSIS — F33.0 MAJOR DEPRESSIVE DISORDER, RECURRENT EPISODE, MILD (H): ICD-10-CM

## 2018-10-26 DIAGNOSIS — Z11.3 SCREEN FOR STD (SEXUALLY TRANSMITTED DISEASE): ICD-10-CM

## 2018-10-26 DIAGNOSIS — G47.00 INSOMNIA, UNSPECIFIED TYPE: ICD-10-CM

## 2018-10-26 LAB
ALBUMIN SERPL-MCNC: 3.9 G/DL (ref 3.4–5)
ALP SERPL-CCNC: 76 U/L (ref 40–150)
ALT SERPL W P-5'-P-CCNC: 30 U/L (ref 0–50)
ANION GAP SERPL CALCULATED.3IONS-SCNC: 6 MMOL/L (ref 3–14)
AST SERPL W P-5'-P-CCNC: 21 U/L (ref 0–45)
BILIRUB SERPL-MCNC: 0.2 MG/DL (ref 0.2–1.3)
BUN SERPL-MCNC: 17 MG/DL (ref 7–30)
CALCIUM SERPL-MCNC: 8.7 MG/DL (ref 8.5–10.1)
CHLORIDE SERPL-SCNC: 104 MMOL/L (ref 94–109)
CHOLEST SERPL-MCNC: 234 MG/DL
CO2 SERPL-SCNC: 27 MMOL/L (ref 20–32)
CREAT SERPL-MCNC: 0.72 MG/DL (ref 0.52–1.04)
GFR SERPL CREATININE-BSD FRML MDRD: 87 ML/MIN/1.7M2
GLUCOSE SERPL-MCNC: 97 MG/DL (ref 70–99)
HBA1C MFR BLD: 5.4 % (ref 0–5.6)
HDLC SERPL-MCNC: 61 MG/DL
LDLC SERPL CALC-MCNC: 140 MG/DL
NONHDLC SERPL-MCNC: 173 MG/DL
POTASSIUM SERPL-SCNC: 3.9 MMOL/L (ref 3.4–5.3)
PROT SERPL-MCNC: 7.7 G/DL (ref 6.8–8.8)
SODIUM SERPL-SCNC: 137 MMOL/L (ref 133–144)
TRIGL SERPL-MCNC: 164 MG/DL

## 2018-10-26 PROCEDURE — 86780 TREPONEMA PALLIDUM: CPT | Performed by: FAMILY MEDICINE

## 2018-10-26 PROCEDURE — 87389 HIV-1 AG W/HIV-1&-2 AB AG IA: CPT | Performed by: FAMILY MEDICINE

## 2018-10-26 PROCEDURE — 83036 HEMOGLOBIN GLYCOSYLATED A1C: CPT | Performed by: FAMILY MEDICINE

## 2018-10-26 PROCEDURE — 87591 N.GONORRHOEAE DNA AMP PROB: CPT | Performed by: FAMILY MEDICINE

## 2018-10-26 PROCEDURE — 80053 COMPREHEN METABOLIC PANEL: CPT | Performed by: FAMILY MEDICINE

## 2018-10-26 PROCEDURE — 87491 CHLMYD TRACH DNA AMP PROBE: CPT | Performed by: FAMILY MEDICINE

## 2018-10-26 PROCEDURE — 80061 LIPID PANEL: CPT | Performed by: FAMILY MEDICINE

## 2018-10-26 PROCEDURE — 36415 COLL VENOUS BLD VENIPUNCTURE: CPT | Performed by: FAMILY MEDICINE

## 2018-10-26 PROCEDURE — 99396 PREV VISIT EST AGE 40-64: CPT | Performed by: FAMILY MEDICINE

## 2018-10-26 RX ORDER — ZOLPIDEM TARTRATE 5 MG/1
TABLET ORAL
Qty: 30 TABLET | Refills: 0 | Status: SHIPPED | OUTPATIENT
Start: 2018-10-26 | End: 2019-01-02

## 2018-10-26 RX ORDER — BUPROPION HYDROCHLORIDE 300 MG/1
TABLET ORAL
Qty: 90 TABLET | Refills: 1 | Status: SHIPPED | OUTPATIENT
Start: 2018-10-26 | End: 2019-09-09

## 2018-10-26 RX ORDER — DULOXETIN HYDROCHLORIDE 30 MG/1
CAPSULE, DELAYED RELEASE ORAL
Qty: 180 CAPSULE | Refills: 1 | Status: SHIPPED | OUTPATIENT
Start: 2018-10-26 | End: 2019-06-26

## 2018-10-26 RX ORDER — SIMVASTATIN 40 MG
40 TABLET ORAL AT BEDTIME
Qty: 90 TABLET | Refills: 3 | Status: SHIPPED | OUTPATIENT
Start: 2018-10-26 | End: 2019-10-03

## 2018-10-26 NOTE — NURSING NOTE
"Initial /88  Pulse 112  Temp 98.8  F (37.1  C) (Tympanic)  Ht 5' 4.75\" (1.645 m)  Wt 193 lb (87.5 kg)  LMP 11/15/2013  Breastfeeding? No  BMI 32.37 kg/m2 Estimated body mass index is 32.37 kg/(m^2) as calculated from the following:    Height as of this encounter: 5' 4.75\" (1.645 m).    Weight as of this encounter: 193 lb (87.5 kg). .      "

## 2018-10-26 NOTE — PROGRESS NOTES
SUBJECTIVE:   CC: Gloria Mehta is an 48 year old woman who presents for preventive health visit.     Healthy Habits:    Do you get at least three servings of calcium containing foods daily (dairy, green leafy vegetables, etc.)? yes    Amount of exercise or daily activities, outside of work: 4-5 day(s) per week - walking    Problems taking medications regularly No    Medication side effects: No    Have you had an eye exam in the past two years? no    Do you see a dentist twice per year? yes    Do you have sleep apnea, excessive snoring or daytime drowsiness?no      No concerns    Feels things are going very well.  Mood is great.  No concerns    Today's PHQ-2 Score:   PHQ-2 ( 1999 Pf[employer]) 10/26/2018 9/20/2016   Q1: Little interest or pleasure in doing things 0 0   Q2: Feeling down, depressed or hopeless 1 1   PHQ-2 Score 1 1   Q1: Little interest or pleasure in doing things - -   Q2: Feeling down, depressed or hopeless - -   PHQ-2 Score - -       Abuse: Current or Past(Physical, Sexual or Emotional)- No  Do you feel safe in your environment - Yes    Social History   Substance Use Topics     Smoking status: Former Smoker     Packs/day: 0.10     Years: 2.00     Types: Cigarettes     Quit date: 12/23/2014     Smokeless tobacco: Former User     Quit date: 11/4/2013      Comment: 2 cigs daily     Alcohol use Yes      Comment: a beer per month     If you drink alcohol do you typically have >3 drinks per day or >7 drinks per week? No                     Reviewed orders with patient.  Reviewed health maintenance and updated orders accordingly - Yes    Patient under age 50, mutual decision reflected in health maintenance.      Pertinent mammograms are reviewed under the imaging tab.  History of abnormal Pap smear: Status post benign hysterectomy. Health Maintenance and Surgical History updated.  PAP / HPV 10/8/2013 10/26/2010 9/21/2009   PAP NIL NIL NIL     Reviewed and updated as needed this visit by clinical  "staff  Tobacco  Allergies  Meds  Med Hx  Surg Hx  Fam Hx  Soc Hx        Reviewed and updated as needed this visit by Provider  Tobacco  Med Hx  Surg Hx  Fam Hx  Soc Hx       History reviewed. No pertinent past medical history.   Past Surgical History:   Procedure Laterality Date     C  DELIVERY ONLY  , , 10/2001, 2008     CYSTOSCOPY  2013    Procedure: CYSTOSCOPY;;  Surgeon: Dave Jones MD;  Location: [facility]     LAPAROSCOPIC HYSTERECTOMY TOTAL, BILATERAL SALPINGO-OOPHORECTOMY, COMBINED  2013    Procedure: COMBINED LAPAROSCOPIC HYSTERECTOMY TOTAL, SALPINGO-OOPHORECTOMY;  Laparoscopic Total Hysterectomy,Bilateral Salpingectomy with Sparing of the Ovaries;  Surgeon: Dave Jones MD;  Location: [facility]     TUBAL LIGATION  10/2001       ROS:  CONSTITUTIONAL: NEGATIVE for fever, chills, change in weight  INTEGUMENTARY/SKIN: NEGATIVE for worrisome rashes, moles or lesions  EYES: NEGATIVE for vision changes or irritation  ENT: NEGATIVE for ear, mouth and throat problems  RESP: NEGATIVE for significant cough or SOB  BREAST: NEGATIVE for masses, tenderness or discharge  CV: NEGATIVE for chest pain, palpitations or peripheral edema  GI: NEGATIVE for nausea, abdominal pain, heartburn, or change in bowel habits  : NEGATIVE for unusual urinary or vaginal symptoms. No vaginal bleeding.  MUSCULOSKELETAL: NEGATIVE for significant arthralgias or myalgia  NEURO: NEGATIVE for weakness, dizziness or paresthesias  PSYCHIATRIC: NEGATIVE for changes in mood or affect     OBJECTIVE:   /88  Pulse 112  Temp 98.8  F (37.1  C) (Tympanic)  Ht 5' 4.75\" (1.645 m)  Wt 193 lb (87.5 kg)  LMP 11/15/2013  Breastfeeding? No  BMI 32.37 kg/m2  EXAM:  GENERAL APPEARANCE: healthy, alert and no distress  EYES: Eyes grossly normal to inspection, PERRL and conjunctivae and sclerae normal  HENT: ear canals and TM's normal, nose and mouth without ulcers or lesions, oropharynx clear and oral " mucous membranes moist  NECK: no adenopathy, no asymmetry, masses, or scars and thyroid normal to palpation  RESP: lungs clear to auscultation - no rales, rhonchi or wheezes  BREAST: normal without masses, tenderness or nipple discharge and no palpable axillary masses or adenopathy  CV: regular rate and rhythm, normal S1 S2, no S3 or S4, no murmur, click or rub, no peripheral edema and peripheral pulses strong  ABDOMEN: soft, nontender, no hepatosplenomegaly, no masses and bowel sounds normal  MS: no musculoskeletal defects are noted and gait is age appropriate without ataxia  NEURO: Normal strength and tone, sensory exam grossly normal, mentation intact and speech normal  PSYCH: mentation appears normal and affect normal/bright    Diagnostic Test Results:  none     ASSESSMENT/PLAN:       ICD-10-CM    1. Encounter for routine adult health examination without abnormal findings Z00.00    2. Hyperlipidemia LDL goal <160 E78.5 simvastatin (ZOCOR) 40 MG tablet     Comprehensive metabolic panel     Lipid panel reflex to direct LDL Fasting   3. Insomnia, unspecified type G47.00 zolpidem (AMBIEN) 5 MG tablet   4. Tension headache G44.209 DULoxetine (CYMBALTA) 30 MG EC capsule   5. Major depressive disorder, recurrent episode, mild (H) F33.0 buPROPion (WELLBUTRIN XL) 300 MG 24 hr tablet   6. History of gestational diabetes Z86.32 Comprehensive metabolic panel     Hemoglobin A1c   7. Encounter for screening mammogram for breast cancer Z12.31 MA Screening Digital Bilateral   8. Screen for STD (sexually transmitted disease) Z11.3 NEISSERIA GONORRHOEA PCR     CHLAMYDIA TRACHOMATIS PCR     HIV Antigen Antibody Combo     Treponema Abs w Reflex to RPR and Titer       COUNSELING:   Reviewed preventive health counseling, as reflected in patient instructions  Special attention given to:        Regular exercise       Healthy diet/nutrition       Osteoporosis Prevention/Bone Health       Safe sex practices/STD prevention       Colon  "cancer screening       (Kathe)menopause management    BP Readings from Last 1 Encounters:   10/26/18 130/88     Estimated body mass index is 32.37 kg/(m^2) as calculated from the following:    Height as of this encounter: 5' 4.75\" (1.645 m).    Weight as of this encounter: 193 lb (87.5 kg).    BP Screening:   Last 3 BP Readings:    BP Readings from Last 3 Encounters:   10/26/18 130/88   10/23/17 124/88   09/20/16 114/78       The following was recommended to the patient:  Re-screen BP within a year and recommended lifestyle modifications  Weight management plan: Discussed healthy diet and exercise guidelines and patient will follow up in 12 months in clinic to re-evaluate.     reports that she quit smoking about 3 years ago. Her smoking use included Cigarettes. She has a 0.20 pack-year smoking history. She quit smokeless tobacco use about 5 years ago.      Counseling Resources:  ATP IV Guidelines  Pooled Cohorts Equation Calculator  Breast Cancer Risk Calculator  FRAX Risk Assessment  ICSI Preventive Guidelines  Dietary Guidelines for Americans, 2010  [employer]'s MyPlate  ASA Prophylaxis  Lung CA Screening    Shirley Shaikh, DO  [facility]    Patient Instructions     Preventive Health Recommendations  Female Ages 40 to 49    Yearly exam:     See your health care provider every year in order to  1. Review health changes.   2. Discuss preventive care.    3. Review your medicines if your doctor prescribed any.      Get a Pap test every three years (unless you have an abnormal result and your provider advises testing more often).      If you get Pap tests with HPV test, you only need to test every 5 years, unless you have an abnormal result. You do not need a Pap test if your uterus was removed (hysterectomy) and you have not had cancer.      You should be tested each year for STDs (sexually transmitted diseases), if you're at risk.     Ask your doctor if you should have a mammogram.      Have a colonoscopy " (test for colon cancer) if someone in your family has had colon cancer or polyps before age 50.       Have a cholesterol test every 5 years.       Have a diabetes test (fasting glucose) after age 45. If you are at risk for diabetes, you should have this test every 3 years.    Shots: Get a flu shot each year. Get a tetanus shot every 10 years.     Nutrition:     Eat at least 5 servings of fruits and vegetables each day.    Eat whole-grain bread, whole-wheat pasta and brown rice instead of white grains and rice.    Get adequate Calcium and Vitamin D.      Lifestyle    Exercise at least 150 minutes a week (an average of 30 minutes a day, 5 days a week). This will help you control your weight and prevent disease.    Limit alcohol to one drink per day.    No smoking.     Wear sunscreen to prevent skin cancer.    See your dentist every six months for an exam and cleaning.

## 2018-10-26 NOTE — MR AVS SNAPSHOT
After Visit Summary   10/26/2018    Gloria Mehta    MRN: [id number]           Patient Information     Date Of Birth          1970        Visit Information        Provider Department      10/26/2018 8:40 AM Shirley Shaikh DO [facility]        Today's Diagnoses     Encounter for routine adult health examination without abnormal findings    -  1    Encounter for screening mammogram for breast cancer        Hyperlipidemia LDL goal <160        Insomnia, unspecified type        Tension headache        Major depressive disorder, recurrent episode, mild (H)        History of gestational diabetes        Screen for STD (sexually transmitted disease)          Care Instructions      Preventive Health Recommendations  Female Ages 40 to 49    Yearly exam:     See your health care provider every year in order to  1. Review health changes.   2. Discuss preventive care.    3. Review your medicines if your doctor prescribed any.      Get a Pap test every three years (unless you have an abnormal result and your provider advises testing more often).      If you get Pap tests with HPV test, you only need to test every 5 years, unless you have an abnormal result. You do not need a Pap test if your uterus was removed (hysterectomy) and you have not had cancer.      You should be tested each year for STDs (sexually transmitted diseases), if you're at risk.     Ask your doctor if you should have a mammogram.      Have a colonoscopy (test for colon cancer) if someone in your family has had colon cancer or polyps before age 50.       Have a cholesterol test every 5 years.       Have a diabetes test (fasting glucose) after age 45. If you are at risk for diabetes, you should have this test every 3 years.    Shots: Get a flu shot each year. Get a tetanus shot every 10 years.     Nutrition:     Eat at least 5 servings of fruits and vegetables each day.    Eat whole-grain bread, whole-wheat pasta and brown  rice instead of white grains and rice.    Get adequate Calcium and Vitamin D.      Lifestyle    Exercise at least 150 minutes a week (an average of 30 minutes a day, 5 days a week). This will help you control your weight and prevent disease.    Limit alcohol to one drink per day.    No smoking.     Wear sunscreen to prevent skin cancer.    See your dentist every six months for an exam and cleaning.          Follow-ups after your visit        Future tests that were ordered for you today     Open Future Orders        Priority Expected Expires Ordered    MA Screening Digital Bilateral Routine  10/26/2019 10/26/2018            Who to contact     Normal or non-critical lab and imaging results will be communicated to you by Clinicient[facility], letter or phone within 4 business days after the clinic has received the results. If you do not hear from us within 7 days, please contact the clinic through [employer] or phone. If you have a critical or abnormal lab result, we will notify you by phone as soon as possible.  Submit refill requests through [employer] or call your pharmacy and they will forward the refill request to us. Please allow 3 business days for your refill to be completed.          If you need to speak with a  for additional information , please call: [phone]           Additional Information About Your Visit        BookBub Information     [employer] gives you secure access to your electronic health record. If you see a primary care provider, you can also send messages to your care team and make appointments. If you have questions, please call your primary care clinic.  If you do not have a primary care provider, please call [phone] and they will assist you.        Care EveryWhere ID     This is your Care EveryWhere ID. This could be used by other organizations to access your [facility] medical records  [id number]        Your Vitals Were     Pulse Temperature Height Last Period Breastfeeding? BMI (Body  "Mass Index)    112 98.8  F (37.1  C) (Tympanic) 5' 4.75\" (1.645 m) 11/15/2013 No 32.37 kg/m2       Blood Pressure from Last 3 Encounters:   10/26/18 130/88   10/23/17 124/88   09/20/16 114/78    Weight from Last 3 Encounters:   10/26/18 193 lb (87.5 kg)   10/23/17 200 lb 3.2 oz (90.8 kg)   09/20/16 202 lb 9.6 oz (91.9 kg)              We Performed the Following     CHLAMYDIA TRACHOMATIS PCR     Comprehensive metabolic panel     DEPRESSION ACTION PLAN (DAP)     Hemoglobin A1c     HIV Antigen Antibody Combo     Lipid panel reflex to direct LDL Fasting     NEISSERIA GONORRHOEA PCR     Treponema Abs w Reflex to RPR and Titer          Today's Medication Changes          These changes are accurate as of 10/26/18  9:18 AM.  If you have any questions, ask your nurse or doctor.               These medicines have changed or have updated prescriptions.        Dose/Directions    simvastatin 40 MG tablet   Commonly known as:  ZOCOR   This may have changed:  Another medication with the same name was removed. Continue taking this medication, and follow the directions you see here.   Used for:  Hyperlipidemia LDL goal <160   Changed by:  Shirley Shaikh DO        Dose:  40 mg   Take 1 tablet (40 mg) by mouth At Bedtime   Quantity:  90 tablet   Refills:  3            Where to get your medicines      These medications were sent to [id number] - [place], [place] - [address], [place] [address]     Phone:  [phone]     buPROPion 300 MG 24 hr tablet    DULoxetine 30 MG EC capsule    simvastatin 40 MG tablet         Some of these will need a paper prescription and others can be bought over the counter.  Ask your nurse if you have questions.     Bring a paper prescription for each of these medications     zolpidem 5 MG tablet                Primary Care Provider Office Phone # Fax #    Shirley Shaikh [phone] [address] [place] [address]        Equal Access to Services     [facility] Hospital[facility] : Hadii aad ku hadmarcelsean Brown, wastefda brandon, qakai islas. So [facility] [phone].    ATENCIÓN: Si habla español, tiene a juan disposición servicios gratuitos de asistencia lingüística. [facility] [phone].    We comply with applicable federal civil rights laws and [place] laws. We do not discriminate on the basis of race, color, national origin, age, disability, sex, sexual orientation, or gender identity.            Thank you!     Thank you for choosing [facility] [place]  for your care. Our goal is always to provide you with excellent care. Hearing back from our patients is one way we can continue to improve our services. Please take a few minutes to complete the written survey that you may receive in the mail after your visit with us. Thank you!             Your Updated Medication List - Protect others around you: Learn how to safely use, store and throw away your medicines at www.disposemymeds.org.          This list is accurate as of 10/26/18  9:18 AM.  Always use your most recent med list.                   Brand Name Dispense Instructions for use Diagnosis    buPROPion 300 MG 24 hr tablet    WELLBUTRIN XL    90 tablet    TAKE 1 TABLET(300 MG) BY MOUTH DAILY    Major depressive disorder, recurrent episode, mild (H)       cyclobenzaprine 10 MG tablet    FLEXERIL    90 tablet    TAKE 1 TABLET(10 MG) BY MOUTH THREE TIMES DAILY AS NEEDED FOR MUSCLE SPASMS    Lumbar paraspinal muscle spasm       DULoxetine 30 MG EC capsule    CYMBALTA    180 capsule    TAKE 1 CAPSULE BY MOUTH TWICE DAILY    Tension headache       fluticasone 50 MCG/ACT spray    FLONASE    16 mL    SHAKE LIQUID AND USE 1 SPRAY IN EACH NOSTRIL DAILY    Allergic rhinitis due to pollen       ibuprofen 200 MG tablet    ADVIL/MOTRIN    100 tablet    Take 1-2 tablets (200-400 mg) by mouth every 6 hours as needed  for other (mild pain)    Status post hysterectomy       simvastatin 40 MG tablet    ZOCOR    90 tablet    Take 1 tablet (40 mg) by mouth At Bedtime    Hyperlipidemia LDL goal <160       zolpidem 5 MG tablet    AMBIEN    30 tablet    TAKE 1 TABLET BY MOUTH EVERY DAY AT BEDTIME AS NEEDED FOR SLEEP.    Insomnia, unspecified type

## 2018-10-26 NOTE — LETTER
My Depression Action Plan  Name: Gloria Mehta   Date of Birth 1970  Date: 10/26/2018    My doctor: Shirley Shaikh   My clinic: [address] [id number] [phone]          GREEN    ZONE   Good Control    What it looks like:     Things are going generally well. You have normal up s and down s. You may even feel depressed from time to time, but bad moods usually last less than a day.   What you need to do:  1. Continue to care for yourself (see self care plan)  2. Check your depression survival kit and update it as needed  3. Follow your physician s recommendations including any medication.  4. Do not stop taking medication unless you consult with your physician first.           YELLOW         ZONE Getting Worse    What it looks like:     Depression is starting to interfere with your life.     It may be hard to get out of bed; you may be starting to isolate yourself from others.    Symptoms of depression are starting to last most all day and this has happened for several days.     You may have suicidal thoughts but they are not constant.   What you need to do:     1. Call your care team, your response to treatment will improve if you keep your care team informed of your progress. Yellow periods are signs an adjustment may need to be made.     2. Continue your self-care, even if you have to fake it!    3. Talk to someone in your support network    4. Open up your depression survival kit           RED    ZONE Medical Alert - Get Help    What it looks like:     Depression is seriously interfering with your life.     You may experience these or other symptoms: You can t get out of bed most days, can t work or engage in other necessary activities, you have trouble taking care of basic hygiene, or basic responsibilities, thoughts of suicide or death that will not go away, self-injurious behavior.     What you need to do:  1. Call your care team and  request a same-day appointment. If they are not available (weekends or after hours) call your local crisis line, emergency room or 911.            Depression Self Care Plan / Survival Kit    Self-Care for Depression  Here s the deal. Your body and mind are really not as separate as most people think.  What you do and think affects how you feel and how you feel influences what you do and think. This means if you do things that people who feel good do, it will help you feel better.  Sometimes this is all it takes.  There is also a place for medication and therapy depending on how severe your depression is, so be sure to consult with your medical provider and/ or Behavioral Health Consultant if your symptoms are worsening or not improving.     In order to better manage my stress, I will:    Exercise  Get some form of exercise, every day. This will help reduce pain and release endorphins, the  feel good  chemicals in your brain. This is almost as good as taking antidepressants!  This is not the same as joining a gym and then never going! (they count on that by the way ) It can be as simple as just going for a walk or doing some gardening, anything that will get you moving.      Hygiene   Maintain good hygiene (Get out of bed in the morning, Make your bed, Brush your teeth, Take a shower, and Get dressed like you were going to work, even if you are unemployed).  If your clothes don't fit try to get ones that do.    Diet  I will strive to eat foods that are good for me, drink plenty of water, and avoid excessive sugar, caffeine, alcohol, and other mood-altering substances.  Some foods that are helpful in depression are: complex carbohydrates, B vitamins, flaxseed, fish or fish oil, fresh fruits and vegetables.    Psychotherapy  I agree to participate in Individual Therapy (if recommended).    Medication  If prescribed medications, I agree to take them.  Missing doses can result in serious side effects.  I understand that  drinking alcohol, or other illicit drug use, may cause potential side effects.  I will not stop my medication abruptly without first discussing it with my provider.    Staying Connected With Others  I will stay in touch with my friends, family members, and my primary care provider/team.    Use your imagination  Be creative.  We all have a creative side; it doesn t matter if it s oil painting, sand castles, or mud pies! This will also kick up the endorphins.    Witness Beauty  (AKA stop and smell the roses) Take a look outside, even in mid-winter. Notice colors, textures. Watch the squirrels and birds.     Service to others  Be of service to others.  There is always someone else in need.  By helping others we can  get out of ourselves  and remember the really important things.  This also provides opportunities for practicing all the other parts of the program.    Humor  Laugh and be silly!  Adjust your TV habits for less news and crime-drama and more comedy.    Control your stress  Try breathing deep, massage therapy, biofeedback, and meditation. Find time to relax each day.     My support system    Clinic Contact:  Phone number:    Contact 1:  Phone number:    Contact 2:  Phone number:    [place]/:  Phone number:    Therapist:  Phone number:    Local crisis center:    Phone number:    Other community support:  Phone number:

## 2018-10-27 LAB
HIV 1+2 AB+HIV1 P24 AG SERPL QL IA: NONREACTIVE
T PALLIDUM AB SER QL: NONREACTIVE

## 2018-10-28 LAB
C TRACH DNA SPEC QL NAA+PROBE: NEGATIVE
N GONORRHOEA DNA SPEC QL NAA+PROBE: NEGATIVE
SPECIMEN SOURCE: NORMAL
SPECIMEN SOURCE: NORMAL

## 2018-10-30 ENCOUNTER — MYC MEDICAL ADVICE (OUTPATIENT)
Dept: FAMILY MEDICINE | Facility: CLINIC | Age: 48
End: 2018-10-30

## 2018-11-05 ENCOUNTER — RADIANT APPOINTMENT (OUTPATIENT)
Dept: MAMMOGRAPHY | Facility: CLINIC | Age: 48
End: 2018-11-05
Attending: FAMILY MEDICINE
Payer: COMMERCIAL

## 2018-11-05 DIAGNOSIS — Z12.31 ENCOUNTER FOR SCREENING MAMMOGRAM FOR BREAST CANCER: ICD-10-CM

## 2018-11-05 PROCEDURE — 77067 SCR MAMMO BI INCL CAD: CPT | Performed by: RADIOLOGY

## 2018-11-06 DIAGNOSIS — N64.52 NIPPLE DISCHARGE: Primary | ICD-10-CM

## 2019-01-02 DIAGNOSIS — G47.00 INSOMNIA, UNSPECIFIED TYPE: ICD-10-CM

## 2019-01-02 DIAGNOSIS — M62.830 LUMBAR PARASPINAL MUSCLE SPASM: ICD-10-CM

## 2019-01-02 NOTE — TELEPHONE ENCOUNTER
Zolpidem 5mg      Last Written Prescription Date:  10/26/2018 #30 x 0  Last filled 10/02/2018  Last Office Visit: 10/26/2018  Future Office visit:   None    Routing refill request to provider for review/approval because:  Drug not on the [facility], [facility] or [facility] refill protocol or controlled substance      Requested Prescriptions   Pending Prescriptions Disp Refills     cyclobenzaprine (FLEXERIL) 10 MG tablet [Pharmacy Med Name: CYCLOBENZAPRINE 10MG TABLETS]  Last Written Prescription Date:  10/02/2018 #90 x 0  Last filled 10/2/2018  Last office visit: 10/26/2018 PERLA Shaikh   Future Office Visit:  None   90 tablet 0     Sig: TAKE 1 TABLET(10 MG) BY MOUTH THREE TIMES DAILY AS NEEDED FOR MUSCLE SPASMS    There is no refill protocol information for this order

## 2019-01-04 RX ORDER — CYCLOBENZAPRINE HCL 10 MG
TABLET ORAL
Qty: 90 TABLET | Refills: 0 | Status: SHIPPED | OUTPATIENT
Start: 2019-01-04 | End: 2019-04-01

## 2019-01-04 RX ORDER — ZOLPIDEM TARTRATE 5 MG/1
TABLET ORAL
Qty: 30 TABLET | Refills: 0 | Status: SHIPPED | OUTPATIENT
Start: 2019-01-04 | End: 2019-04-01

## 2019-04-01 DIAGNOSIS — M62.830 LUMBAR PARASPINAL MUSCLE SPASM: ICD-10-CM

## 2019-04-01 DIAGNOSIS — G47.00 INSOMNIA, UNSPECIFIED TYPE: ICD-10-CM

## 2019-04-01 NOTE — TELEPHONE ENCOUNTER
Zolpidem 5mg      Last Written Prescription Date:  01/04/2019 #30 x 0  Last filled 01/04/2019  Last Office Visit: 10/26/2018 PERLA Shaikh  Future Office visit:   None    Routing refill request to provider for review/approval because:  Drug not on the [facility], [facility] or [facility] refill protocol or controlled substance      Requested Prescriptions   Pending Prescriptions Disp Refills     cyclobenzaprine (FLEXERIL) 10 MG tablet [Pharmacy Med Name: CYCLOBENZAPRINE 10MG TABLETS]  Last Written Prescription Date:  01/04/2019 #90 x 0  Last filled 01/04/2019  Last office visit: 10/26/2018 PERLA Shaikh   Future Office Visit:  None   90 tablet 0     Sig: TAKE 1 TABLET(10 MG) BY MOUTH THREE TIMES DAILY AS NEEDED FOR MUSCLE SPASMS    There is no refill protocol information for this order

## 2019-04-02 RX ORDER — CYCLOBENZAPRINE HCL 10 MG
TABLET ORAL
Qty: 90 TABLET | Refills: 0 | Status: SHIPPED | OUTPATIENT
Start: 2019-04-02 | End: 2019-06-26

## 2019-04-02 RX ORDER — ZOLPIDEM TARTRATE 5 MG/1
TABLET ORAL
Qty: 30 TABLET | Refills: 0 | Status: SHIPPED | OUTPATIENT
Start: 2019-04-02 | End: 2019-06-26

## 2019-04-02 NOTE — TELEPHONE ENCOUNTER
Routing refill requests to provider for review/approval because:  Drugs not on the [facility] refill protocol     Zoila BRUNO RN

## 2019-05-23 ENCOUNTER — E-VISIT (OUTPATIENT)
Dept: FAMILY MEDICINE | Facility: CLINIC | Age: 49
End: 2019-05-23
Payer: COMMERCIAL

## 2019-05-23 ENCOUNTER — MYC MEDICAL ADVICE (OUTPATIENT)
Dept: FAMILY MEDICINE | Facility: CLINIC | Age: 49
End: 2019-05-23

## 2019-05-23 DIAGNOSIS — R51.9 ACUTE INTRACTABLE HEADACHE, UNSPECIFIED HEADACHE TYPE: Primary | ICD-10-CM

## 2019-05-23 PROCEDURE — 99207 ZZC NO BILLABLE SERVICE THIS VISIT: CPT | Performed by: FAMILY MEDICINE

## 2019-05-23 NOTE — LETTER
[address]  [address]  Ph. [phone]  Fax [phone]          May 24, 2019          To Whom it Concerns,          Gloria Huntermj was unable to work May 22 2019 and May 23 2019 due to a medical condition.           Sincerely,        Shirley Shaikh D.O.

## 2019-05-28 ENCOUNTER — OFFICE VISIT (OUTPATIENT)
Dept: FAMILY MEDICINE | Facility: CLINIC | Age: 49
End: 2019-05-28
Payer: COMMERCIAL

## 2019-05-28 VITALS
RESPIRATION RATE: 16 BRPM | HEART RATE: 100 BPM | SYSTOLIC BLOOD PRESSURE: 130 MMHG | TEMPERATURE: 98.9 F | BODY MASS INDEX: 33.29 KG/M2 | WEIGHT: 199.8 LBS | HEIGHT: 65 IN | DIASTOLIC BLOOD PRESSURE: 88 MMHG

## 2019-05-28 DIAGNOSIS — G44.209 TENSION HEADACHE: ICD-10-CM

## 2019-05-28 DIAGNOSIS — G44.84 EXERTIONAL HEADACHE: Primary | ICD-10-CM

## 2019-05-28 DIAGNOSIS — G43.109 MIGRAINE WITH AURA AND WITHOUT STATUS MIGRAINOSUS, NOT INTRACTABLE: ICD-10-CM

## 2019-05-28 LAB
ANION GAP SERPL CALCULATED.3IONS-SCNC: 4 MMOL/L (ref 3–14)
BUN SERPL-MCNC: 16 MG/DL (ref 7–30)
CALCIUM SERPL-MCNC: 9.3 MG/DL (ref 8.5–10.1)
CHLORIDE SERPL-SCNC: 102 MMOL/L (ref 94–109)
CO2 SERPL-SCNC: 28 MMOL/L (ref 20–32)
CREAT SERPL-MCNC: 0.7 MG/DL (ref 0.52–1.04)
GFR SERPL CREATININE-BSD FRML MDRD: >90 ML/MIN/{1.73_M2}
GLUCOSE SERPL-MCNC: 108 MG/DL (ref 70–99)
POTASSIUM SERPL-SCNC: 3.9 MMOL/L (ref 3.4–5.3)
SODIUM SERPL-SCNC: 134 MMOL/L (ref 133–144)

## 2019-05-28 PROCEDURE — 80048 BASIC METABOLIC PNL TOTAL CA: CPT | Performed by: FAMILY MEDICINE

## 2019-05-28 PROCEDURE — 36415 COLL VENOUS BLD VENIPUNCTURE: CPT | Performed by: FAMILY MEDICINE

## 2019-05-28 PROCEDURE — 99214 OFFICE O/P EST MOD 30 MIN: CPT | Performed by: FAMILY MEDICINE

## 2019-05-28 RX ORDER — TOPIRAMATE 25 MG/1
25 TABLET, FILM COATED ORAL DAILY
Qty: 60 TABLET | Refills: 1 | Status: SHIPPED | OUTPATIENT
Start: 2019-05-28 | End: 2019-08-09

## 2019-05-28 ASSESSMENT — ANXIETY QUESTIONNAIRES
GAD7 TOTAL SCORE: 0
2. NOT BEING ABLE TO STOP OR CONTROL WORRYING: NOT AT ALL
1. FEELING NERVOUS, ANXIOUS, OR ON EDGE: NOT AT ALL
6. BECOMING EASILY ANNOYED OR IRRITABLE: NOT AT ALL
3. WORRYING TOO MUCH ABOUT DIFFERENT THINGS: NOT AT ALL
7. FEELING AFRAID AS IF SOMETHING AWFUL MIGHT HAPPEN: NOT AT ALL
5. BEING SO RESTLESS THAT IT IS HARD TO SIT STILL: NOT AT ALL

## 2019-05-28 ASSESSMENT — MIFFLIN-ST. JEOR: SCORE: 1537.17

## 2019-05-28 ASSESSMENT — PATIENT HEALTH QUESTIONNAIRE - PHQ9
SUM OF ALL RESPONSES TO PHQ QUESTIONS 1-9: 3
5. POOR APPETITE OR OVEREATING: NOT AT ALL

## 2019-05-28 ASSESSMENT — PAIN SCALES - GENERAL: PAINLEVEL: MILD PAIN (3)

## 2019-05-28 NOTE — LETTER
May 29, 2019      Gloria Janine  [address] [facility] [address]        Dear ,    We are writing to inform you of your test results.    Your electrolytes and kidney testing were normal.    Resulted Orders   Basic metabolic panel   Result Value Ref Range    Sodium 134 133 - 144 mmol/L    Potassium 3.9 3.4 - 5.3 mmol/L    Chloride 102 94 - 109 mmol/L    Carbon Dioxide 28 20 - 32 mmol/L    Anion Gap 4 3 - 14 mmol/L    Glucose 108 (H) 70 - 99 mg/dL    Urea Nitrogen 16 7 - 30 mg/dL    Creatinine 0.70 0.52 - 1.04 mg/dL    GFR Estimate >90 >60 mL/min/[1.73_m2]      Comment:      Non  GFR Calc  Starting 12/18/2018, serum creatinine based estimated GFR (eGFR) will be   calculated using the Chronic Kidney Disease Epidemiology Collaboration   (CKD-EPI) equation.      GFR Estimate If Black >90 >60 mL/min/[1.73_m2]      Comment:       GFR Calc  Starting 12/18/2018, serum creatinine based estimated GFR (eGFR) will be   calculated using the Chronic Kidney Disease Epidemiology Collaboration   (CKD-EPI) equation.      Calcium 9.3 8.5 - 10.1 mg/dL       If you have any questions or concerns, please call the clinic at the number listed above.       Sincerely,        Shirley Shaikh DO

## 2019-05-28 NOTE — PATIENT INSTRUCTIONS
Please call [phone] to schedule your MRI/MRA as soon as possible, ideally in the next few days.  If you have another bad headache associated with exertion or intercourse please seek care in the ER.    We'll begin the topamax for the headaches.  Let me know if you have any trouble with the medication.    Please schedule with physical therapy for the tension headaches as well.      Let's visit again in 1 month to see where we are.

## 2019-05-28 NOTE — PROGRESS NOTES
"Subjective     Gloria Mehta is a 49 year old female who presents to clinic today for the following health issues:    HPI   Headaches      Duration: years    Description  Location: entire head   Character: tension, sinus, vascular  Frequency:  1-2 times per month  Duration:  1 week    Intensity:  severe    Accompanying signs and symptoms: severe pain behind eyes    Precipitating or Alleviating factors:  Nausea/vomiting: usually has nausea but does not vomit  Dizziness: feels a head rush then feels dizzy when she stands up  Weakness or numbness: no  Visual changes: tunnel vision, seeing spots, blurred vision   Fever: no   Sinus or URI symptoms YES- sinus pressure currently, has a deviated septum    History  Head trauma: YES- fell backwards and head on ice last year 3 times  Family history of migraines: YES- brother   Previous tests for headaches: YES  Neurologist evaluations: YES- 2007 prescribed Depakote and it did help. Then she got pregnant so stopped taking it.   Able to do daily activities when headache present: no  Wake with headaches: YES  Daily pain medication use: YES-ibuprofen 600 mg-800 mg 2-3 times daily. Will also take Excedrin once per day if needed.   Any changes in: none    Precipitating or Alleviating factors (light/sound/sleep/caffeine): sensitive to light    Therapies tried and outcome: Flexeril, Meloxicam   Outcome - not effective  Frequent/daily pain medication use: YES        Has been having headaches for many years, at least 20 years.  HA's have waxed and waned over that time frame.      Has 3-4 different types of HA, tension, sinus, vascular/migraine.  Saw neurologist in 2007 and was diagnosed as above.  Was placed on depakote which worked but stopped med due to pregnancy.  Never went back to neurology after that.    Has never had an abortive medication for headache    \"Vascular headaches\":  Last episode 2 weeks ago.  Occurred during intercourse.  Had sudden onset of severe headache.  Felt " "like she was incapacitated by the pain.  Pain was located behind her eyes.  Has had similar headaches in the past but has been happening more frequently, at least 1-2 times per month.  These headaches are always exertional, during intercourse, exercise, heavy lifting.  Maximum intensity at onset, can last days.  Did have imaging in 2007 but intensity and frequency of headaches are worse now.    Sinus headaches:  Finds these managable with over the counter nasal spray.  Can get rid of these    Tension headaches:  Most frequent, occurring a couple of times per week.  These have bene long standing, somewhat more intense lately after a few falls over the winter slipping on the ice.  Did hit her head with those falls as well.   Did not have a headache yesterday but This AM woke with a tension headache across the forehead and behind her eyes.  Treats with a theracane, ibuprofen/excedrin, ice/heat.  Uses flexeril as well which has not been as helpful lately    Has not done PT for the neck.      Migraine headaches:  Initial episode in 1993, has had a few over the years but seem to be getting worse over the last 3 years.  Remain sporadic, perhaps once monthly lately.  HA begins with visual changes.  Develops tunnel vision and spots in front of her eyes.  This lasts about 10 minutes and then headache develops.  Feels this headache is the entire head but worst behind the eyes.   Pain is throbbing and stabbing.  Hurts to move.  Does get nauseated, no vomiting.  No numbness/tingling/weakness in extremities, no speech issues.   Occasionally feels like balance is off.  These she needs to \"sleep off\".         Reviewed and updated as needed this visit by Provider  Tobacco  Med Hx  Surg Hx  Fam Hx  Soc Hx        Review of Systems   ROS COMP: Constitutional, HEENT, cardiovascular, pulmonary, gi and gu systems are negative, except as otherwise noted.      Objective    /88 (BP Location: Right arm, Patient Position: Sitting, " "Cuff Size: Adult Large)   Pulse 100   Temp 98.9  F (37.2  C) (Tympanic)   Resp 16   Ht 1.651 m (5' 5\")   Wt 90.6 kg (199 lb 12.8 oz)   LMP 11/15/2013   BMI 33.25 kg/m    Body mass index is 33.25 kg/m .  Physical Exam   PE:  VS as above   Gen:  WN/WD/WH female in NAD   HEENT:  NC/AT, conjunctiva wnl, TM's wnl aquiles pearly gray   with good light reflex, oropharynx clear without    exudate/erythema   Neck:  supple, no LAD appreciated   Heart:  RRR without murmur, nl S1, S2, no rubs or gallops   Lungs CTA aquiles without rales/ronchi/wheezes   Ext:  No pedal edema   Neuro:  Normal gait, muscle strength intact throughout      Diagnostic Test Results:  Labs reviewed in Epic        A/p:      ICD-10-CM    1. Exertional headache G44.84 MR Brain w/o & w Contrast     MRA Brain ([place] [facility] [place]) wo Contrast   2. Tension headache G44.209 Basic metabolic panel     topiramate (TOPAMAX) 25 MG tablet     [facility] PT, HAND, AND CHIROPRACTIC REFERRAL   3. Migraine with aura and without status migrainosus, not intractable G43.109 Basic metabolic panel     topiramate (TOPAMAX) 25 MG tablet     Patient Instructions   Please call [phone] to schedule your MRI/MRA as soon as possible, ideally in the next few days.  If you have another bad headache associated with exertion or intercourse please seek care in the ER.    We'll begin the topamax for the headaches.  Let me know if you have any trouble with the medication.    Please schedule with physical therapy for the tension headaches as well.      Let's visit again in 1 month to see where we are.      Pt with long standing HA but worsening exertional and migraine headaches.  No documentation of previous MRA, updated brain imaging ordered.    Reviewed options for management.  Reviewed medication related HA.  Will begin low dose topiramate as prophylaxis.  strongly advised PT for muscle tension component.      Reviewed red flags and reasons to seek emergent care.    F/u 1 month        "

## 2019-05-29 ASSESSMENT — ANXIETY QUESTIONNAIRES: GAD7 TOTAL SCORE: 0

## 2019-06-03 ENCOUNTER — ANCILLARY PROCEDURE (OUTPATIENT)
Dept: MRI IMAGING | Facility: CLINIC | Age: 49
End: 2019-06-03
Attending: FAMILY MEDICINE
Payer: COMMERCIAL

## 2019-06-03 DIAGNOSIS — G44.84 EXERTIONAL HEADACHE: ICD-10-CM

## 2019-06-03 PROCEDURE — 70551 MRI BRAIN STEM W/O DYE: CPT | Performed by: RADIOLOGY

## 2019-06-26 DIAGNOSIS — M62.830 LUMBAR PARASPINAL MUSCLE SPASM: ICD-10-CM

## 2019-06-26 DIAGNOSIS — J30.1 ALLERGIC RHINITIS DUE TO POLLEN: ICD-10-CM

## 2019-06-26 DIAGNOSIS — G44.209 TENSION HEADACHE: ICD-10-CM

## 2019-06-26 DIAGNOSIS — G47.00 INSOMNIA, UNSPECIFIED TYPE: ICD-10-CM

## 2019-06-26 RX ORDER — FLUTICASONE PROPIONATE 50 MCG
SPRAY, SUSPENSION (ML) NASAL
Qty: 16 ML | Refills: 2 | Status: SHIPPED | OUTPATIENT
Start: 2019-06-26 | End: 2021-06-10

## 2019-06-26 NOTE — TELEPHONE ENCOUNTER
"Requested Prescriptions   Pending Prescriptions Disp Refills     fluticasone (FLONASE) 50 MCG/ACT nasal spray [Pharmacy Med Name: FLUTICASONE 50MCG NASAL SP (120) RX]  Last Written Prescription Date:  3/28/18  Last Fill Quantity: 16ml,  # refills: 2   Last office visit: 5/28/2019 with prescribing provider:  deb   Future Office Visit:     16 mL 0     Sig: SHAKE LIQUID AND USE 1 SPRAY IN EACH NOSTRIL DAILY       Inhaled Steroids Protocol Passed - 6/26/2019  1:33 PM        Passed - Patient is age 12 or older        Passed - Recent (12 mo) or future (30 days) visit within the authorizing provider's specialty     Patient had office visit in the last 12 months or has a visit in the next 30 days with authorizing provider or within the authorizing provider's specialty.  See \"Patient Info\" tab in inbasket, or \"Choose Columns\" in Meds & Orders section of the refill encounter.              Passed - Medication is active on med list          "

## 2019-06-26 NOTE — TELEPHONE ENCOUNTER
Prescription approved per [facility] Refill Protocol or patient Primary care provider (PCP)  BRENTON Adame RN/Emil Corona

## 2019-06-26 NOTE — TELEPHONE ENCOUNTER
"Requested Prescriptions   Pending Prescriptions Disp Refills     zolpidem (AMBIEN) 5 MG tablet [Pharmacy Med Name: ZOLPIDEM 5MG TABLETS]  Last Written Prescription Date:  4/2/19  Last Fill Quantity: 30,  # refills: 0   Last office visit: 5/28/2019 with prescribing provider:  deb   Future Office Visit:     30 tablet 0     Sig: TAKE 1 TABLET BY MOUTH EVERY NIGHT AT BEDTIME AS NEEDED FOR SLEEP       There is no refill protocol information for this order        DULoxetine (CYMBALTA) 30 MG capsule [Pharmacy Med Name: DULOXETINE DR 30MG CAPSULES]  Last Written Prescription Date:  10/26/18  Last Fill Quantity: 180,  # refills: 1   Last office visit: 5/28/2019 with prescribing provider:  deb   Future Office Visit:     180 capsule 0     Sig: TAKE 1 CAPSULE BY MOUTH TWICE DAILY       Serotonin-Norepinephrine Reuptake Inhibitors  Passed - 6/26/2019  1:33 PM        Passed - Blood pressure under 140/90 in past 12 months     BP Readings from Last 3 Encounters:   05/28/19 130/88   10/26/18 130/88   10/23/17 124/88                 Passed - Recent (12 mo) or future (30 days) visit within the authorizing provider's specialty     Patient had office visit in the last 12 months or has a visit in the next 30 days with authorizing provider or within the authorizing provider's specialty.  See \"Patient Info\" tab in inbasket, or \"Choose Columns\" in Meds & Orders section of the refill encounter.              Passed - Medication is active on med list        Passed - Patient is age 18 or older        Passed - No active pregnancy on record        Passed - No positive pregnancy test in past 12 months        cyclobenzaprine (FLEXERIL) 10 MG tablet [Pharmacy Med Name: CYCLOBENZAPRINE 10MG TABLETS]  Last Written Prescription Date:  4/2/19  Last Fill Quantity: 90,  # refills: 0   Last office visit: 5/28/2019 with prescribing provider:  deb   Future Office Visit:     90 tablet 0     Sig: TAKE 1 TABLET(10 MG) BY MOUTH THREE TIMES DAILY AS NEEDED FOR " MUSCLE SPASMS       There is no refill protocol information for this order

## 2019-06-27 NOTE — TELEPHONE ENCOUNTER
Routing refill request to provider for review/approval because:  Two medications not on refill protocol. Cecilia Cohen RN

## 2019-06-28 RX ORDER — ZOLPIDEM TARTRATE 5 MG/1
TABLET ORAL
Qty: 30 TABLET | Refills: 0 | Status: SHIPPED | OUTPATIENT
Start: 2019-06-28 | End: 2019-09-09

## 2019-06-28 RX ORDER — DULOXETIN HYDROCHLORIDE 30 MG/1
CAPSULE, DELAYED RELEASE ORAL
Qty: 180 CAPSULE | Refills: 0 | Status: SHIPPED | OUTPATIENT
Start: 2019-06-28 | End: 2019-09-09

## 2019-06-28 RX ORDER — CYCLOBENZAPRINE HCL 10 MG
TABLET ORAL
Qty: 90 TABLET | Refills: 0 | Status: SHIPPED | OUTPATIENT
Start: 2019-06-28 | End: 2019-08-09

## 2019-06-28 NOTE — TELEPHONE ENCOUNTER
meds refilled.  Please call pt and let her know she is due for f/u of her headaches.  Should schedule to see me    Shirley Shaikh

## 2019-08-09 ENCOUNTER — MYC REFILL (OUTPATIENT)
Dept: FAMILY MEDICINE | Facility: CLINIC | Age: 49
End: 2019-08-09

## 2019-08-09 DIAGNOSIS — G44.209 TENSION HEADACHE: ICD-10-CM

## 2019-08-09 DIAGNOSIS — F33.0 MAJOR DEPRESSIVE DISORDER, RECURRENT EPISODE, MILD (H): ICD-10-CM

## 2019-08-09 DIAGNOSIS — M62.830 LUMBAR PARASPINAL MUSCLE SPASM: ICD-10-CM

## 2019-08-09 DIAGNOSIS — G43.109 MIGRAINE WITH AURA AND WITHOUT STATUS MIGRAINOSUS, NOT INTRACTABLE: ICD-10-CM

## 2019-08-09 NOTE — TELEPHONE ENCOUNTER
"Requested Prescriptions   Pending Prescriptions Disp Refills     topiramate (TOPAMAX) 25 MG tablet [Pharmacy Med Name: TOPIRAMATE 25MG TABLETS] 60 tablet 0     Sig: TAKE 1 TABLET(25 MG) BY MOUTH DAILY FOR 1 WEEK. INCREASE TO 25 MG 2 TIMES DAILY  Last Written Prescription Date:  05/28/2019 #60 x 1  Last filled 06/26/2019  Last office visit: 5/28/2019 PERLA Shaikh     Future Office Visit:   Next 5 appointments (look out 90 days)    Sep 09, 2019  6:20 PM CDT  Dylan Yousif with Shirley Shaikh, DO  [facility] [address] [facility] [address]  [phone]                Anti-Seizure Meds Protocol  Failed - 8/9/2019 10:57 AM        Failed - Review Authorizing provider's last note.      Refer to last progress notes: confirm request is for original authorizing provider (cannot be through other providers).          Failed - Normal CBC on file in past 26 months     Recent Labs   Lab Test 11/07/13  1542   WBC 8.7   RBC 3.91   HGB 12.4   HCT 36.9                    Failed - Normal platelet count on file in past 26 months     Recent Labs   Lab Test 11/07/13  1542                  Passed - Recent (12 mo) or future (30 days) visit within the authorizing provider's specialty     Patient had office visit in the last 12 months or has a visit in the next 30 days with authorizing provider or within the authorizing provider's specialty.  See \"Patient Info\" tab in inbasket, or \"Choose Columns\" in Meds & Orders section of the refill encounter.              Passed - Normal ALT or AST on file in past 26 months     Recent Labs   Lab Test 10/26/18  0925   ALT 30     Recent Labs   Lab Test 10/26/18  0925   AST 21             Passed - Medication is active on med list        Passed - No active pregnancy on record        Passed - No positive pregnancy test in last 12 months        cyclobenzaprine (FLEXERIL) 10 MG tablet [Pharmacy Med Name: CYCLOBENZAPRINE 10MG TABLETS] 90 " tablet 0     Sig: TAKE 1 TABLET(10 MG) BY MOUTH THREE TIMES DAILY AS NEEDED FOR MUSCLE SPASMS  Last Written Prescription Date:  06/28/2019 #90 x 0  Last filled 06/28/2019  Last office visit: 5/28/2019 PERLA Shaikh     Future Office Visit:   Next 5 appointments (look out 90 days)    Sep 09, 2019  6:20 PM CDT  [facility]Chart Short with Shirley [facility]ikh DO  [facility] [address] [facility] [address]  [phone]                There is no refill protocol information for this order        buPROPion (WELLBUTRIN XL) 300 MG 24 hr tablet [Pharmacy Med Name: BUPROPION XL 300MG TABLETS] 90 tablet 0     Sig: TAKE 1 TABLET(300 MG) BY MOUTH DAILY  Last Written Prescription Date:  10/26/2018 #90 x 1  Last filled 04/24/2019  Last office visit: 5/28/2019 PERLA Shaikh     Future Office Visit:   Next 5 appointments (look out 90 days)    Sep 09, 2019  6:20 PM CDT  [facility]Chart Short with Shirley [facility]ikh DO  [facility] [address] [facility] [address]  [phone]                SSRIs Protocol Passed - 8/9/2019 10:57 AM  PHQ-9 SCORE 10/23/2017 10/9/2018 5/28/2019   PHQ-9 Total Score - - -   PHQ-9 Total Score MyChart - 3 (Minimal depression) -   PHQ-9 Total Score 8 3 3       NORMAN-7 SCORE 12/18/2014 5/28/2019   Total Score 4 -   Total Score - 0               Passed - PHQ-9 score less than 5 in past 6 months     Please review last PHQ-9 score.           Passed - Medication is Bupropion     If the medication is Bupropion (Wellbutrin), and the patient is taking for smoking cessation; OK to refill.          Passed - Medication is active on med list        Passed - Patient is age 18 or older        Passed - No active pregnancy on record        Passed - No positive pregnancy test in last 12 months        Passed - Recent (6 mo) or future (30 days) visit within the authorizing provider's specialty     Patient had office visit in the last 6  "months or has a visit in the next 30 days with authorizing provider or within the authorizing provider's specialty.  See \"Patient Info\" tab in inbasket, or \"Choose Columns\" in Meds & Orders section of the refill encounter.              "

## 2019-08-09 NOTE — TELEPHONE ENCOUNTER
Routing refill request to provider for review/approval because:  Labs due and flexeril not on list. Cecilia Cohen RN

## 2019-08-10 RX ORDER — TOPIRAMATE 25 MG/1
25 TABLET, FILM COATED ORAL 2 TIMES DAILY
Qty: 60 TABLET | Refills: 0 | Status: SHIPPED | OUTPATIENT
Start: 2019-08-10 | End: 2019-09-09

## 2019-08-10 RX ORDER — BUPROPION HYDROCHLORIDE 300 MG/1
TABLET ORAL
Qty: 90 TABLET | Refills: 0 | Status: SHIPPED | OUTPATIENT
Start: 2019-08-10 | End: 2019-09-09

## 2019-08-10 RX ORDER — CYCLOBENZAPRINE HCL 10 MG
TABLET ORAL
Qty: 90 TABLET | Refills: 0 | Status: SHIPPED | OUTPATIENT
Start: 2019-08-10 | End: 2019-10-03

## 2019-08-10 NOTE — TELEPHONE ENCOUNTER
Please call pt.  Audrey for follow up in clinic rior to her next topamax refill.  Shirley Shaikh

## 2019-08-12 RX ORDER — TOPIRAMATE 25 MG/1
25 TABLET, FILM COATED ORAL DAILY
Qty: 60 TABLET | Refills: 1 | OUTPATIENT
Start: 2019-08-12

## 2019-09-09 ENCOUNTER — OFFICE VISIT (OUTPATIENT)
Dept: FAMILY MEDICINE | Facility: CLINIC | Age: 49
End: 2019-09-09
Payer: COMMERCIAL

## 2019-09-09 VITALS
HEIGHT: 65 IN | SYSTOLIC BLOOD PRESSURE: 132 MMHG | WEIGHT: 194.8 LBS | BODY MASS INDEX: 32.46 KG/M2 | HEART RATE: 96 BPM | TEMPERATURE: 98.4 F | DIASTOLIC BLOOD PRESSURE: 80 MMHG | RESPIRATION RATE: 12 BRPM

## 2019-09-09 DIAGNOSIS — G44.209 TENSION HEADACHE: ICD-10-CM

## 2019-09-09 DIAGNOSIS — F33.0 MAJOR DEPRESSIVE DISORDER, RECURRENT EPISODE, MILD (H): ICD-10-CM

## 2019-09-09 DIAGNOSIS — G47.00 INSOMNIA, UNSPECIFIED TYPE: ICD-10-CM

## 2019-09-09 DIAGNOSIS — G43.109 MIGRAINE WITH AURA AND WITHOUT STATUS MIGRAINOSUS, NOT INTRACTABLE: ICD-10-CM

## 2019-09-09 PROCEDURE — 80048 BASIC METABOLIC PNL TOTAL CA: CPT | Performed by: FAMILY MEDICINE

## 2019-09-09 PROCEDURE — 99213 OFFICE O/P EST LOW 20 MIN: CPT | Performed by: FAMILY MEDICINE

## 2019-09-09 PROCEDURE — 36415 COLL VENOUS BLD VENIPUNCTURE: CPT | Performed by: FAMILY MEDICINE

## 2019-09-09 RX ORDER — MELOXICAM 7.5 MG/1
1 TABLET ORAL DAILY
Refills: 2 | COMMUNITY
Start: 2019-06-26

## 2019-09-09 RX ORDER — ZOLPIDEM TARTRATE 5 MG/1
TABLET ORAL
Qty: 30 TABLET | Refills: 0 | Status: SHIPPED | OUTPATIENT
Start: 2019-09-09 | End: 2020-02-25

## 2019-09-09 RX ORDER — TOPIRAMATE 25 MG/1
25 TABLET, FILM COATED ORAL 2 TIMES DAILY
Qty: 180 TABLET | Refills: 3 | Status: SHIPPED | OUTPATIENT
Start: 2019-09-09 | End: 2020-02-25

## 2019-09-09 RX ORDER — DULOXETIN HYDROCHLORIDE 30 MG/1
30 CAPSULE, DELAYED RELEASE ORAL 2 TIMES DAILY
Qty: 180 CAPSULE | Refills: 1 | Status: SHIPPED | OUTPATIENT
Start: 2019-09-09 | End: 2020-02-25

## 2019-09-09 RX ORDER — BUPROPION HYDROCHLORIDE 300 MG/1
TABLET ORAL
Qty: 90 TABLET | Refills: 1 | Status: SHIPPED | OUTPATIENT
Start: 2019-09-09 | End: 2020-02-25

## 2019-09-09 ASSESSMENT — MIFFLIN-ST. JEOR: SCORE: 1514.49

## 2019-09-09 NOTE — NURSING NOTE
"Initial /80   Pulse 96   Temp 98.4  F (36.9  C) (Tympanic)   Resp 12   Ht 1.651 m (5' 5\")   Wt 88.4 kg (194 lb 12.8 oz)   LMP 11/15/2013   Breastfeeding? No   BMI 32.42 kg/m   Estimated body mass index is 32.42 kg/m  as calculated from the following:    Height as of this encounter: 1.651 m (5' 5\").    Weight as of this encounter: 88.4 kg (194 lb 12.8 oz). .      "

## 2019-09-09 NOTE — PROGRESS NOTES
"Subjective     Gloria Mehta is a 49 year old female who presents to clinic today for the following health issues:    HPI   Migraine     Since your last clinic visit, how have your headaches changed?  Improved    How often are you getting headaches or migraines? Has not had a headache for about one month     Are you able to do normal daily activities when you have a migraine? Yes    Are you taking rescue/relief medications? (Select all that apply) ibuprofen (Advil, Motrin) and Other: flexeril    How helpful is your rescue/relief medication?  I get some relief    Are you taking any medications to prevent migraines? (Select all that apply)  Topomax    In the past 4 weeks, how often have you gone to urgent care or the emergency room because of your headaches?  0        How many servings of fruits and vegetables do you eat daily?  2-3    On average, how many sweetened beverages do you drink each day (soda, juice, sweet tea, etc)?   2    How many days per week do you miss taking your medication? 0    Has been doing much better with her headaches.  Has been very surprised by the results.  Went from almost daily HA ot none for the last month.  Feels she is tolerating topiramate well with no noted side effects.      Reviewed and updated as needed this visit by Provider  Tobacco  Allergies  Meds  Med Hx  Surg Hx  Fam Hx  Soc Hx        Review of Systems   ROS COMP: Constitutional, HEENT, cardiovascular, pulmonary, gi and gu systems are negative, except as otherwise noted.      Objective    /80   Pulse 96   Temp 98.4  F (36.9  C) (Tympanic)   Resp 12   Ht 1.651 m (5' 5\")   Wt 88.4 kg (194 lb 12.8 oz)   LMP 11/15/2013   Breastfeeding? No   BMI 32.42 kg/m    Body mass index is 32.42 kg/m .  Physical Exam   PE:  VS as above   Gen:  WN/WD/WH female in NAD   Heart:  RRR without murmur, nl S1, S2, no rubs or gallops   Lungs CTA aquiles without rales/ronchi/wheezes   Ext:  No pedal edema   Psych: Alert and oriented " times 3; coherent speech, normal   rate and volume, able to articulate logical thoughts, able   to abstract reason, no tangential thoughts, no hallucinations   or delusions  Her affect is bright and appropriate    Diagnostic Test Results:  Labs reviewed in Epic        A/P:      ICD-10-CM    1. Migraine with aura and without status migrainosus, not intractable G43.109 topiramate (TOPAMAX) 25 MG tablet     Basic metabolic panel   2. Tension headache G44.209 DULoxetine (CYMBALTA) 30 MG capsule     topiramate (TOPAMAX) 25 MG tablet   3. Major depressive disorder, recurrent episode, mild (H) F33.0 buPROPion (WELLBUTRIN XL) 300 MG 24 hr tablet   4. Insomnia, unspecified type G47.00 zolpidem (AMBIEN) 5 MG tablet     Patient Instructions   So glad things are going well!  I refilled your medications and will let you know lab results when available.        Migraine:  Much improved, continue current meds, labs today  Mood:  Stable, continue current meds  Insomnia:  Stable, continue current meds.

## 2019-09-10 LAB
ANION GAP SERPL CALCULATED.3IONS-SCNC: 6 MMOL/L (ref 3–14)
BUN SERPL-MCNC: 14 MG/DL (ref 7–30)
CALCIUM SERPL-MCNC: 9.2 MG/DL (ref 8.5–10.1)
CHLORIDE SERPL-SCNC: 107 MMOL/L (ref 94–109)
CO2 SERPL-SCNC: 24 MMOL/L (ref 20–32)
CREAT SERPL-MCNC: 0.73 MG/DL (ref 0.52–1.04)
GFR SERPL CREATININE-BSD FRML MDRD: >90 ML/MIN/{1.73_M2}
GLUCOSE SERPL-MCNC: 89 MG/DL (ref 70–99)
POTASSIUM SERPL-SCNC: 3.7 MMOL/L (ref 3.4–5.3)
SODIUM SERPL-SCNC: 137 MMOL/L (ref 133–144)

## 2019-09-10 NOTE — PATIENT INSTRUCTIONS
So glad things are going well!  I refilled your medications and will let you know lab results when available.

## 2019-10-03 DIAGNOSIS — M62.830 LUMBAR PARASPINAL MUSCLE SPASM: ICD-10-CM

## 2019-10-03 DIAGNOSIS — E78.5 HYPERLIPIDEMIA LDL GOAL <160: ICD-10-CM

## 2019-10-03 NOTE — TELEPHONE ENCOUNTER
"Requested Prescriptions   Pending Prescriptions Disp Refills     cyclobenzaprine (FLEXERIL) 10 MG tablet [Pharmacy Med Name: CYCLOBENZAPRINE 10MG TABLETS] 90 tablet 0     Sig: TAKE 1 TABLET(10 MG) BY MOUTH THREE TIMES DAILY AS NEEDED FOR MUSCLE SPASMS  Last Written Prescription Date:  08/10/2019 #90 x 0  Last filled 08/10/2019  Last office visit: 9/9/2019 PERLA Shaikh   Future Office Visit:  None         There is no refill protocol information for this order        simvastatin (ZOCOR) 40 MG tablet [Pharmacy Med Name: SIMVASTATIN 40MG TABLETS] 90 tablet 0     Sig: TAKE 1 TABLET(40 MG) BY MOUTH AT BEDTIME  Last Written Prescription Date:  10/26/2018 #90 x 3  Last filled 08/09/2019  Last office visit: 9/9/2019 PERLA Shaikh   Future Office Visit:  None         Statins Protocol Passed - 10/3/2019  2:55 PM        Passed - LDL on file in past 12 months     Recent Labs   Lab Test 10/26/18  0925   *             Passed - No abnormal creatine kinase in past 12 months     Recent Labs   Lab Test 10/16/12  0951                   Passed - Recent (12 mo) or future (30 days) visit within the authorizing provider's specialty     Patient has had an office visit with the authorizing provider or a provider within the authorizing providers department within the previous 12 mos or has a future within next 30 days. See \"Patient Info\" tab in inbasket, or \"Choose Columns\" in Meds & Orders section of the refill encounter.              Passed - Medication is active on med list        Passed - Patient is age 18 or older        Passed - No active pregnancy on record        Passed - No positive pregnancy test in past 12 months          "

## 2019-10-04 RX ORDER — CYCLOBENZAPRINE HCL 10 MG
TABLET ORAL
Qty: 90 TABLET | Refills: 0 | Status: SHIPPED | OUTPATIENT
Start: 2019-10-04 | End: 2020-02-25

## 2019-10-04 RX ORDER — SIMVASTATIN 40 MG
TABLET ORAL
Qty: 60 TABLET | Refills: 0 | Status: SHIPPED | OUTPATIENT
Start: 2019-10-04 | End: 2020-01-08

## 2019-10-04 NOTE — TELEPHONE ENCOUNTER
Routing refill request to provider for review/approval because:  Drug not on the [facility] refill protocol     LAWRENCE Melgar

## 2019-11-14 ENCOUNTER — MYC MEDICAL ADVICE (OUTPATIENT)
Dept: FAMILY MEDICINE | Facility: CLINIC | Age: 49
End: 2019-11-14

## 2020-01-06 DIAGNOSIS — E78.5 HYPERLIPIDEMIA LDL GOAL <160: ICD-10-CM

## 2020-01-07 ENCOUNTER — TELEPHONE (OUTPATIENT)
Dept: FAMILY MEDICINE | Facility: CLINIC | Age: 50
End: 2020-01-07

## 2020-01-07 NOTE — TELEPHONE ENCOUNTER
"Requested Prescriptions   Pending Prescriptions Disp Refills     simvastatin (ZOCOR) 40 MG tablet [Pharmacy Med Name: SIMVASTATIN 40MG TABLETS] 60 tablet 0     Sig: TAKE 1 TABLET BY MOUTH AT BEDTIME  Last Written Prescription Date:  10/04/2019 #60 x 0  Last filled - not provided  Last office visit: 9/9/2019 PERLA Shaikh   Future Office Visit:  None         Statins Protocol Failed - 1/6/2020  7:22 AM        Failed - LDL on file in past 12 months     Recent Labs   Lab Test 10/26/18  0925   *             Passed - No abnormal creatine kinase in past 12 months     Recent Labs   Lab Test 10/16/12  0951                   Passed - Recent (12 mo) or future (30 days) visit within the authorizing provider's specialty     Patient has had an office visit with the authorizing provider or a provider within the authorizing providers department within the previous 12 mos or has a future within next 30 days. See \"Patient Info\" tab in inbasket, or \"Choose Columns\" in Meds & Orders section of the refill encounter.              Passed - Medication is active on med list        Passed - Patient is age 18 or older        Passed - No active pregnancy on record        Passed - No positive pregnancy test in past 12 months          "

## 2020-01-07 NOTE — TELEPHONE ENCOUNTER
Received a Prior Authorization request from [facility] for Duloxetine DR 30mg    Routed to the [facility] electronic prior authorization team.          Juve Juarez RT (r)  [facility]

## 2020-01-08 RX ORDER — SIMVASTATIN 40 MG
TABLET ORAL
Qty: 60 TABLET | Refills: 0 | Status: SHIPPED | OUTPATIENT
Start: 2020-01-08 | End: 2020-02-25

## 2020-01-08 NOTE — TELEPHONE ENCOUNTER
Routing refill request to provider for review/approval because:  Labs out of range:  LDL: 140  Labs not current:  LDL:  10/26/2018    Renee Dominguez RN

## 2020-01-08 NOTE — TELEPHONE ENCOUNTER
Central Prior Authorization Team   Phone: [phone]    PA Initiation    Medication: Duloxetine DR  Insurance Company: [employer] - Phone [phone] Fax [phone]  Pharmacy Filling the Rx: [id number] [place], [place] - [facility] [id number] AT [facility] [address]  Filling Pharmacy Phone: [phone]  Filling Pharmacy Fax: [phone]  Start Date: 1/8/2020

## 2020-01-08 NOTE — TELEPHONE ENCOUNTER
Central Prior Authorization Team   Phone: [phone]    Prior Authorization Approval    Authorization Effective Date: 1/1/2020  Authorization Expiration Date: 1/30/2023  Medication: Duloxetine DR  Approved Dose/Quantity: 60 per 30 days  Reference #: [id number]   Insurance Company: [employer] - Phone [phone] Fax [phone]  Expected CoPay:       CoPay Card Available:      Foundation Assistance Needed:    Which Pharmacy is filling the prescription (Not needed for infusion/clinic administered): [id number] - [place], [place] - [facility] [address]  Pharmacy Notified: Yes  Patient Notified: Yes  **Instructed pharmacy to notify patient when script is ready to /ship.**

## 2020-02-24 ENCOUNTER — HEALTH MAINTENANCE LETTER (OUTPATIENT)
Age: 50
End: 2020-02-24

## 2020-02-25 ENCOUNTER — OFFICE VISIT (OUTPATIENT)
Dept: FAMILY MEDICINE | Facility: CLINIC | Age: 50
End: 2020-02-25
Payer: COMMERCIAL

## 2020-02-25 VITALS
HEIGHT: 65 IN | BODY MASS INDEX: 33.05 KG/M2 | RESPIRATION RATE: 16 BRPM | WEIGHT: 198.4 LBS | TEMPERATURE: 98.8 F | DIASTOLIC BLOOD PRESSURE: 88 MMHG | SYSTOLIC BLOOD PRESSURE: 132 MMHG | HEART RATE: 84 BPM

## 2020-02-25 DIAGNOSIS — E78.5 HYPERLIPIDEMIA LDL GOAL <160: ICD-10-CM

## 2020-02-25 DIAGNOSIS — G44.209 TENSION HEADACHE: ICD-10-CM

## 2020-02-25 DIAGNOSIS — Z12.31 ENCOUNTER FOR SCREENING MAMMOGRAM FOR BREAST CANCER: ICD-10-CM

## 2020-02-25 DIAGNOSIS — G47.00 INSOMNIA, UNSPECIFIED TYPE: ICD-10-CM

## 2020-02-25 DIAGNOSIS — Z00.00 ROUTINE GENERAL MEDICAL EXAMINATION AT A HEALTH CARE FACILITY: Primary | ICD-10-CM

## 2020-02-25 DIAGNOSIS — M62.830 LUMBAR PARASPINAL MUSCLE SPASM: ICD-10-CM

## 2020-02-25 DIAGNOSIS — Z86.32 HISTORY OF GESTATIONAL DIABETES: ICD-10-CM

## 2020-02-25 DIAGNOSIS — F32.5 MAJOR DEPRESSION IN COMPLETE REMISSION (H): ICD-10-CM

## 2020-02-25 DIAGNOSIS — G43.109 MIGRAINE WITH AURA AND WITHOUT STATUS MIGRAINOSUS, NOT INTRACTABLE: ICD-10-CM

## 2020-02-25 LAB
ALBUMIN SERPL-MCNC: 3.8 G/DL (ref 3.4–5)
ALP SERPL-CCNC: 75 U/L (ref 40–150)
ALT SERPL W P-5'-P-CCNC: 32 U/L (ref 0–50)
ANION GAP SERPL CALCULATED.3IONS-SCNC: 4 MMOL/L (ref 3–14)
AST SERPL W P-5'-P-CCNC: 18 U/L (ref 0–45)
BILIRUB SERPL-MCNC: 0.3 MG/DL (ref 0.2–1.3)
BUN SERPL-MCNC: 9 MG/DL (ref 7–30)
CALCIUM SERPL-MCNC: 8.8 MG/DL (ref 8.5–10.1)
CHLORIDE SERPL-SCNC: 105 MMOL/L (ref 94–109)
CHOLEST SERPL-MCNC: 201 MG/DL
CO2 SERPL-SCNC: 27 MMOL/L (ref 20–32)
CREAT SERPL-MCNC: 0.81 MG/DL (ref 0.52–1.04)
GFR SERPL CREATININE-BSD FRML MDRD: 85 ML/MIN/{1.73_M2}
GLUCOSE SERPL-MCNC: 113 MG/DL (ref 70–99)
HBA1C MFR BLD: 5.4 % (ref 0–5.6)
HDLC SERPL-MCNC: 59 MG/DL
LDLC SERPL CALC-MCNC: 92 MG/DL
NONHDLC SERPL-MCNC: 142 MG/DL
POTASSIUM SERPL-SCNC: 3.7 MMOL/L (ref 3.4–5.3)
PROT SERPL-MCNC: 7.5 G/DL (ref 6.8–8.8)
SODIUM SERPL-SCNC: 136 MMOL/L (ref 133–144)
TRIGL SERPL-MCNC: 250 MG/DL

## 2020-02-25 PROCEDURE — 99396 PREV VISIT EST AGE 40-64: CPT | Performed by: FAMILY MEDICINE

## 2020-02-25 PROCEDURE — 83036 HEMOGLOBIN GLYCOSYLATED A1C: CPT | Performed by: FAMILY MEDICINE

## 2020-02-25 PROCEDURE — 80061 LIPID PANEL: CPT | Performed by: FAMILY MEDICINE

## 2020-02-25 PROCEDURE — 80053 COMPREHEN METABOLIC PANEL: CPT | Performed by: FAMILY MEDICINE

## 2020-02-25 PROCEDURE — 36415 COLL VENOUS BLD VENIPUNCTURE: CPT | Performed by: FAMILY MEDICINE

## 2020-02-25 RX ORDER — CYCLOBENZAPRINE HCL 10 MG
TABLET ORAL
Qty: 90 TABLET | Refills: 0 | Status: SHIPPED | OUTPATIENT
Start: 2020-02-25 | End: 2020-11-23

## 2020-02-25 RX ORDER — TOPIRAMATE 25 MG/1
25 TABLET, FILM COATED ORAL 2 TIMES DAILY
Qty: 180 TABLET | Refills: 3 | Status: SHIPPED | OUTPATIENT
Start: 2020-02-25 | End: 2021-06-10

## 2020-02-25 RX ORDER — ZOLPIDEM TARTRATE 5 MG/1
TABLET ORAL
Qty: 30 TABLET | Refills: 0 | Status: SHIPPED | OUTPATIENT
Start: 2020-02-25 | End: 2021-06-10

## 2020-02-25 RX ORDER — DULOXETIN HYDROCHLORIDE 30 MG/1
30 CAPSULE, DELAYED RELEASE ORAL 2 TIMES DAILY
Qty: 180 CAPSULE | Refills: 1 | Status: SHIPPED | OUTPATIENT
Start: 2020-02-25 | End: 2020-10-07

## 2020-02-25 RX ORDER — SIMVASTATIN 40 MG
40 TABLET ORAL AT BEDTIME
Qty: 90 TABLET | Refills: 3 | Status: SHIPPED | OUTPATIENT
Start: 2020-02-25 | End: 2021-03-11

## 2020-02-25 RX ORDER — BUPROPION HYDROCHLORIDE 300 MG/1
TABLET ORAL
Qty: 90 TABLET | Refills: 1 | Status: SHIPPED | OUTPATIENT
Start: 2020-02-25 | End: 2020-11-05

## 2020-02-25 ASSESSMENT — ANXIETY QUESTIONNAIRES
2. NOT BEING ABLE TO STOP OR CONTROL WORRYING: NOT AT ALL
7. FEELING AFRAID AS IF SOMETHING AWFUL MIGHT HAPPEN: NOT AT ALL
1. FEELING NERVOUS, ANXIOUS, OR ON EDGE: NOT AT ALL
3. WORRYING TOO MUCH ABOUT DIFFERENT THINGS: NOT AT ALL
5. BEING SO RESTLESS THAT IT IS HARD TO SIT STILL: NOT AT ALL
IF YOU CHECKED OFF ANY PROBLEMS ON THIS QUESTIONNAIRE, HOW DIFFICULT HAVE THESE PROBLEMS MADE IT FOR YOU TO DO YOUR WORK, TAKE CARE OF THINGS AT HOME, OR GET ALONG WITH OTHER PEOPLE: NOT DIFFICULT AT ALL
GAD7 TOTAL SCORE: 0
6. BECOMING EASILY ANNOYED OR IRRITABLE: NOT AT ALL

## 2020-02-25 ASSESSMENT — MIFFLIN-ST. JEOR: SCORE: 1527.69

## 2020-02-25 ASSESSMENT — PATIENT HEALTH QUESTIONNAIRE - PHQ9
SUM OF ALL RESPONSES TO PHQ QUESTIONS 1-9: 2
5. POOR APPETITE OR OVEREATING: NOT AT ALL

## 2020-02-25 ASSESSMENT — PAIN SCALES - GENERAL: PAINLEVEL: NO PAIN (0)

## 2020-02-25 NOTE — NURSING NOTE
"Initial /88 (BP Location: Right arm, Patient Position: Chair, Cuff Size: Adult Large)   Pulse 84   Temp 98.8  F (37.1  C) (Tympanic)   Resp 16   Ht 1.646 m (5' 4.8\")   Wt 90 kg (198 lb 6.4 oz)   LMP 11/15/2013   BMI 33.22 kg/m   Estimated body mass index is 33.22 kg/m  as calculated from the following:    Height as of this encounter: 1.646 m (5' 4.8\").    Weight as of this encounter: 90 kg (198 lb 6.4 oz). .    Kerrie Amos CMA ([facility])    "

## 2020-02-25 NOTE — PROGRESS NOTES
SUBJECTIVE:   CC: Gloria Mehta is an 49 year old woman who presents for preventive health visit.     Healthy Habits:    Getting at least 3 servings of Calcium per day:  NO    Bi-annual eye exam:  Yes    Dental care twice a year:  Yes    Sleep apnea or symptoms of sleep apnea:  Excessive snoring    Diet:  Regular (no restrictions)    Frequency of exercise:  2-3 days/week    Duration of exercise:  15-30 minutes    Taking medications regularly:  Yes    Barriers to taking medications:  None    Medication side effects:  None    PHQ-2 Total Score:    Additional concerns today:  No    *Is fasting.    Topiramate is working well.  Rarely getting HA now, either sinus or muscle tension but not the sharp/bad headaches.  No side effects noted    Mild residual BPPV.  Only noted if she rolls too quickly to her R side in bed.  Resolving.  Not interested in additional treatment.      Today's PHQ-2 Score:   PHQ-2 (  [employer]) 10/26/2018   Q1: Little interest or pleasure in doing things 0   Q2: Feeling down, depressed or hopeless 1   PHQ-2 Score 1   Q1: Little interest or pleasure in doing things -   Q2: Feeling down, depressed or hopeless -   PHQ-2 Score -     Abuse: Current or Past(Physical, Sexual or Emotional)- No  Do you feel safe in your environment? Yes    Social History     Tobacco Use     Smoking status: Light Tobacco Smoker     Packs/day: 0.25     Years: 8.00     Pack years: 2.00     Types: Cigarettes     Last attempt to quit: 2014     Years since quittin.1     Smokeless tobacco: Never Used     Tobacco comment: 2 cigs daily   Substance Use Topics     Alcohol use: Yes     Comment: maybe one beer a month or once every 2 months     If you drink alcohol do you typically have >3 drinks per day or >7 drinks per week? No    Alcohol Use 2020   Prescreen: >3 drinks/day or >7 drinks/week? No     Reviewed orders with patient.  Reviewed health maintenance and updated orders accordingly - Yes  Labs reviewed in  [facility]    Mammogram Screening: Patient under age 50, mutual decision reflected in health maintenance.      Pertinent mammograms are reviewed under the imaging tab.  History of abnormal Pap smear: Status post benign hysterectomy. Health Maintenance and Surgical History updated.  PAP / HPV 10/8/2013 10/26/2010 2009   PAP NIL NIL NIL     Reviewed and updated as needed this visit by clinical staff  Tobacco  Allergies  Meds  Med Hx  Surg Hx  Fam Hx  Soc Hx        Reviewed and updated as needed this visit by Provider  Tobacco  Allergies  Meds  Med Hx  Surg Hx  Fam Hx  Soc Hx       Past Medical History:   Diagnosis Date     Cancer (H)     aunt     Depressive disorder     mother, siblings     Diabetes (H)     mother, grandmother on father's side      Past Surgical History:   Procedure Laterality Date     ABDOMEN SURGERY      4 c-sections     C  DELIVERY ONLY  , , 10/2001, 2008     CYSTOSCOPY  2013    Procedure: CYSTOSCOPY;;  Surgeon: Dave Jones MD;  Location: [facility] OR     LAPAROSCOPIC HYSTERECTOMY TOTAL, BILATERAL SALPINGO-OOPHORECTOMY, COMBINED  2013    Procedure: COMBINED LAPAROSCOPIC HYSTERECTOMY TOTAL, SALPINGO-OOPHORECTOMY;  Laparoscopic Total Hysterectomy,Bilateral Salpingectomy with Sparing of the Ovaries;  Surgeon: Dave Jones MD;  Location: [facility] OR     TUBAL LIGATION  10/2001       Review of Systems  CONSTITUTIONAL: NEGATIVE for fever, chills, change in weight  INTEGUMENTARY/SKIN: NEGATIVE for worrisome rashes, moles or lesions  EYES: NEGATIVE for vision changes or irritation  ENT: NEGATIVE for ear, mouth and throat problems  RESP: NEGATIVE for significant cough or SOB  BREAST: NEGATIVE for masses, tenderness or discharge  CV: NEGATIVE for chest pain, palpitations or peripheral edema  GI: NEGATIVE for nausea, abdominal pain, heartburn, or change in bowel habits  : NEGATIVE for unusual urinary or vaginal symptoms. No vaginal bleeding.  MUSCULOSKELETAL:  "NEGATIVE for significant arthralgias or myalgia  NEURO: as above  PSYCHIATRIC: NEGATIVE for changes in mood or affect      OBJECTIVE:   /88 (BP Location: Right arm, Patient Position: Chair, Cuff Size: Adult Large)   Pulse 84   Temp 98.8  F (37.1  C) (Tympanic)   Resp 16   Ht 1.646 m (5' 4.8\")   Wt 90 kg (198 lb 6.4 oz)   LMP 11/15/2013   BMI 33.22 kg/m    Physical Exam  GENERAL APPEARANCE: healthy, alert and no distress  EYES: Eyes grossly normal to inspection, PERRL and conjunctivae and sclerae normal  HENT: ear canals and TM's normal, nose and mouth without ulcers or lesions, oropharynx clear and oral mucous membranes moist  NECK: no adenopathy, no asymmetry, masses, or scars and thyroid normal to palpation  RESP: lungs clear to auscultation - no rales, rhonchi or wheezes  BREAST: declined by pt  CV: regular rate and rhythm, normal S1 S2, no S3 or S4, no murmur, click or rub, no peripheral edema and peripheral pulses strong  ABDOMEN: soft, nontender, no hepatosplenomegaly, no masses and bowel sounds normal  MS: no musculoskeletal defects are noted and gait is age appropriate without ataxia  NEURO: Normal strength and tone, sensory exam grossly normal, mentation intact and speech normal  PSYCH: mentation appears normal and affect normal/bright    Diagnostic Test Results:  Labs reviewed in Epic    ASSESSMENT/PLAN:       ICD-10-CM    1. Routine general medical examination at a health care facility Z00.00    2. Hyperlipidemia LDL goal <160 E78.5 simvastatin (ZOCOR) 40 MG tablet     Comprehensive metabolic panel     Lipid panel reflex to direct LDL Fasting   3. Major depression in complete remission (H) F32.5 buPROPion (WELLBUTRIN XL) 300 MG 24 hr tablet   4. Lumbar paraspinal muscle spasm M62.830 cyclobenzaprine (FLEXERIL) 10 MG tablet   5. Insomnia, unspecified type G47.00 zolpidem (AMBIEN) 5 MG tablet   6. Tension headache G44.209 topiramate (TOPAMAX) 25 MG tablet     DULoxetine (CYMBALTA) 30 MG capsule " "  7. Migraine with aura and without status migrainosus, not intractable G43.109 topiramate (TOPAMAX) 25 MG tablet   8. History of gestational diabetes Z86.32 Comprehensive metabolic panel     Hemoglobin A1c   9. Encounter for screening mammogram for breast cancer Z12.31 MA Screening Digital Bilateral       COUNSELING:  Reviewed preventive health counseling, as reflected in patient instructions  Special attention given to:        Regular exercise       Healthy diet/nutrition       Osteoporosis Prevention/Bone Health       Colon cancer screening    Estimated body mass index is 33.22 kg/m  as calculated from the following:    Height as of this encounter: 1.646 m (5' 4.8\").    Weight as of this encounter: 90 kg (198 lb 6.4 oz).    Weight management plan: Discussed healthy diet and exercise guidelines     reports that she has been smoking cigarettes. She has a 2.00 pack-year smoking history. She has never used smokeless tobacco.  Tobacco Cessation Action Plan: Information offered: Patient not interested at this time    Counseling Resources:  ATP IV Guidelines  Pooled Cohorts Equation Calculator  Breast Cancer Risk Calculator  FRAX Risk Assessment  ICSI Preventive Guidelines  Dietary Guidelines for Americans, 2010  USDA's MyPlate  ASA Prophylaxis  Lung CA Screening    Shirley Shaikh, DO  [facility]  "

## 2020-02-26 ASSESSMENT — ANXIETY QUESTIONNAIRES: GAD7 TOTAL SCORE: 0

## 2020-02-27 ENCOUNTER — TELEPHONE (OUTPATIENT)
Dept: FAMILY MEDICINE | Facility: CLINIC | Age: 50
End: 2020-02-27

## 2020-02-27 NOTE — TELEPHONE ENCOUNTER
Received a Prior Authorization Request from [facility] for Zolpidem 5mg    Routed to the [email or web address] electronic prior authorization team            Juve CALIX (r)  [facility]

## 2020-03-03 NOTE — TELEPHONE ENCOUNTER
Prior Authorization Approval    Authorization Effective Date: 2/2/2020  Authorization Expiration Date: 3/3/2021  Medication: Zolpidem  Approved Dose/Quantity:   Reference #: [id number]   Insurance Company: [employer] - Phone [phone] Fax [phone]  Expected CoPay:       CoPay Card Available:      Foundation Assistance Needed:    Which Pharmacy is filling the prescription (Not needed for infusion/clinic administered): [facility] #[id number] - [place], [place] - [address]  Pharmacy Notified: Yes  Patient Notified: Yes, **Instructed pharmacy to notify patient when script is ready to /ship.**

## 2020-03-03 NOTE — TELEPHONE ENCOUNTER
PA Initiation    Medication: Zolpidem  Insurance Company: [employer] - Phone [phone] Fax [phone]  Pharmacy Filling the Rx: [facility] #[id number] [place] [address] [address] [facility] [address]  Filling Pharmacy Phone: [phone]  Filling Pharmacy Fax: [phone]  Start Date: 3/3/2020

## 2020-03-04 ENCOUNTER — ANCILLARY PROCEDURE (OUTPATIENT)
Dept: MAMMOGRAPHY | Facility: CLINIC | Age: 50
End: 2020-03-04
Attending: FAMILY MEDICINE
Payer: COMMERCIAL

## 2020-03-04 DIAGNOSIS — Z12.31 ENCOUNTER FOR SCREENING MAMMOGRAM FOR BREAST CANCER: ICD-10-CM

## 2020-03-04 PROCEDURE — 77067 SCR MAMMO BI INCL CAD: CPT

## 2020-10-06 DIAGNOSIS — G44.209 TENSION HEADACHE: ICD-10-CM

## 2020-10-07 RX ORDER — DULOXETIN HYDROCHLORIDE 30 MG/1
CAPSULE, DELAYED RELEASE ORAL
Qty: 180 CAPSULE | Refills: 1 | Status: SHIPPED | OUTPATIENT
Start: 2020-10-07 | End: 2021-04-02

## 2020-11-02 DIAGNOSIS — F32.5 MAJOR DEPRESSION IN COMPLETE REMISSION (H): ICD-10-CM

## 2020-11-04 NOTE — TELEPHONE ENCOUNTER
"Requested Prescriptions   Pending Prescriptions Disp Refills     buPROPion (WELLBUTRIN XL) 300 MG 24 hr tablet [Pharmacy Med Name: BUPROPION XL 300MG TABLETS] 90 tablet 1     Sig: TAKE 1 TABLET(300 MG) BY MOUTH DAILY       SSRIs Protocol Failed - 11/2/2020 11:02 AM        Failed - PHQ-9 score less than 5 in past 6 months     Please review last PHQ-9 score.           Failed - Recent (6 mo) or future (30 days) visit within the authorizing provider's specialty     Patient had office visit in the last 6 months or has a visit in the next 30 days with authorizing provider or within the authorizing provider's specialty.  See \"Patient Info\" tab in inbasket, or \"Choose Columns\" in Meds & Orders section of the refill encounter.            Passed - Medication is Bupropion     If the medication is Bupropion (Wellbutrin), and the patient is taking for smoking cessation; OK to refill.          Passed - Medication is active on med list        Passed - Patient is age 18 or older        Passed - No active pregnancy on record        Passed - No positive pregnancy test in last 12 months             "

## 2020-11-05 RX ORDER — BUPROPION HYDROCHLORIDE 300 MG/1
TABLET ORAL
Qty: 90 TABLET | Refills: 1 | Status: SHIPPED | OUTPATIENT
Start: 2020-11-05 | End: 2021-05-10

## 2020-11-06 ENCOUNTER — DOCUMENTATION ONLY (OUTPATIENT)
Dept: LAB | Facility: CLINIC | Age: 50
End: 2020-11-06

## 2020-11-06 DIAGNOSIS — Z11.3 SCREEN FOR STD (SEXUALLY TRANSMITTED DISEASE): Primary | ICD-10-CM

## 2020-11-06 NOTE — PROGRESS NOTES
Dr. Shaikh,   Patient is asymptomatic but wanting a screen for all that you can check for, please advise.    LAWRENCE Melgar  HPI      ROS      Physical Exam

## 2020-11-06 NOTE — PROGRESS NOTES
Please clarify what pt is wanting.  Can do gonorrhea/chlamydia on a urine test plus or minus blood tests for HIV and syphilis.    Shirley Shaikh, DO

## 2020-11-09 ENCOUNTER — MYC MEDICAL ADVICE (OUTPATIENT)
Dept: FAMILY MEDICINE | Facility: CLINIC | Age: 50
End: 2020-11-09

## 2020-11-09 ASSESSMENT — PATIENT HEALTH QUESTIONNAIRE - PHQ9
SUM OF ALL RESPONSES TO PHQ QUESTIONS 1-9: 5
SUM OF ALL RESPONSES TO PHQ QUESTIONS 1-9: 5

## 2020-11-10 DIAGNOSIS — Z11.3 SCREEN FOR STD (SEXUALLY TRANSMITTED DISEASE): ICD-10-CM

## 2020-11-10 PROCEDURE — 36415 COLL VENOUS BLD VENIPUNCTURE: CPT | Performed by: FAMILY MEDICINE

## 2020-11-10 PROCEDURE — 86780 TREPONEMA PALLIDUM: CPT | Mod: 90 | Performed by: FAMILY MEDICINE

## 2020-11-10 PROCEDURE — 87591 N.GONORRHOEAE DNA AMP PROB: CPT | Performed by: FAMILY MEDICINE

## 2020-11-10 PROCEDURE — 87491 CHLMYD TRACH DNA AMP PROBE: CPT | Performed by: FAMILY MEDICINE

## 2020-11-10 PROCEDURE — 99000 SPECIMEN HANDLING OFFICE-LAB: CPT | Performed by: FAMILY MEDICINE

## 2020-11-10 PROCEDURE — 87389 HIV-1 AG W/HIV-1&-2 AB AG IA: CPT | Performed by: FAMILY MEDICINE

## 2020-11-10 ASSESSMENT — PATIENT HEALTH QUESTIONNAIRE - PHQ9: SUM OF ALL RESPONSES TO PHQ QUESTIONS 1-9: 5

## 2020-11-11 LAB
C TRACH DNA SPEC QL NAA+PROBE: NEGATIVE
HIV 1+2 AB+HIV1 P24 AG SERPL QL IA: NONREACTIVE
N GONORRHOEA DNA SPEC QL NAA+PROBE: NEGATIVE
SPECIMEN SOURCE: NORMAL
SPECIMEN SOURCE: NORMAL
T PALLIDUM AB SER QL: NONREACTIVE

## 2020-11-21 ENCOUNTER — E-VISIT (OUTPATIENT)
Dept: FAMILY MEDICINE | Facility: CLINIC | Age: 50
End: 2020-11-21
Payer: COMMERCIAL

## 2020-11-21 DIAGNOSIS — N39.0 ACUTE UTI (URINARY TRACT INFECTION): Primary | ICD-10-CM

## 2020-11-21 PROCEDURE — 99421 OL DIG E/M SVC 5-10 MIN: CPT | Performed by: FAMILY MEDICINE

## 2020-11-23 DIAGNOSIS — M62.830 LUMBAR PARASPINAL MUSCLE SPASM: ICD-10-CM

## 2020-11-23 RX ORDER — NITROFURANTOIN 25; 75 MG/1; MG/1
100 CAPSULE ORAL 2 TIMES DAILY
Qty: 14 CAPSULE | Refills: 0 | Status: SHIPPED | OUTPATIENT
Start: 2020-11-23 | End: 2021-06-10

## 2020-11-23 RX ORDER — CYCLOBENZAPRINE HCL 10 MG
TABLET ORAL
Qty: 90 TABLET | Refills: 0 | Status: SHIPPED | OUTPATIENT
Start: 2020-11-23 | End: 2021-04-23

## 2020-11-23 NOTE — TELEPHONE ENCOUNTER
Routing refill request to provider for review/approval because:  Drug not on the [facility] refill protocol   Lizbeth Shah RN

## 2020-11-23 NOTE — TELEPHONE ENCOUNTER
Last Written Prescription Date:  2/25/20  Last Fill Quantity: 90,  # refills: 0   Last office visit: Visit date not found with prescribing provider:  2/25/20 tc   Future Office Visit:

## 2020-12-13 ENCOUNTER — HEALTH MAINTENANCE LETTER (OUTPATIENT)
Age: 50
End: 2020-12-13

## 2021-03-31 DIAGNOSIS — G44.209 TENSION HEADACHE: ICD-10-CM

## 2021-03-31 NOTE — TELEPHONE ENCOUNTER
"Has appointment scheduled for 4/12/21.      Requested Prescriptions   Pending Prescriptions Disp Refills     DULoxetine (CYMBALTA) 30 MG capsule [Pharmacy Med Name: DULOXETINE DR 30MG CAPSULES] 180 capsule 1     Sig: TAKE 1 CAPSULE BY MOUTH TWICE DAILY       Serotonin-Norepinephrine Reuptake Inhibitors  Failed - 3/31/2021 11:35 AM        Failed - Blood pressure under 140/90 in past 12 months     BP Readings from Last 3 Encounters:   02/25/20 132/88   09/09/19 132/80   05/28/19 130/88                 Passed - Recent (12 mo) or future (30 days) visit within the authorizing provider's specialty     Patient has had an office visit with the authorizing provider or a provider within the authorizing providers department within the previous 12 mos or has a future within next 30 days. See \"Patient Info\" tab in inbasket, or \"Choose Columns\" in Meds & Orders section of the refill encounter.              Passed - Medication is active on med list        Passed - Patient is age 18 or older        Passed - No active pregnancy on record        Passed - No positive pregnancy test in past 12 months             "

## 2021-04-02 RX ORDER — DULOXETIN HYDROCHLORIDE 30 MG/1
CAPSULE, DELAYED RELEASE ORAL
Qty: 180 CAPSULE | Refills: 1 | Status: SHIPPED | OUTPATIENT
Start: 2021-04-02 | End: 2021-06-10

## 2021-04-17 ENCOUNTER — HEALTH MAINTENANCE LETTER (OUTPATIENT)
Age: 51
End: 2021-04-17

## 2021-04-21 DIAGNOSIS — M62.830 LUMBAR PARASPINAL MUSCLE SPASM: ICD-10-CM

## 2021-04-21 NOTE — TELEPHONE ENCOUNTER
Routing refill request to provider for review/approval because:  Drug not on the [facility] refill protocol     Kenji Meier RN

## 2021-04-23 RX ORDER — CYCLOBENZAPRINE HCL 10 MG
TABLET ORAL
Qty: 90 TABLET | Refills: 0 | Status: SHIPPED | OUTPATIENT
Start: 2021-04-23 | End: 2021-06-10

## 2021-05-09 DIAGNOSIS — F32.5 MAJOR DEPRESSION IN COMPLETE REMISSION (H): ICD-10-CM

## 2021-05-10 RX ORDER — BUPROPION HYDROCHLORIDE 300 MG/1
TABLET ORAL
Qty: 90 TABLET | Refills: 1 | Status: SHIPPED | OUTPATIENT
Start: 2021-05-10 | End: 2021-06-10

## 2021-05-10 NOTE — TELEPHONE ENCOUNTER
Routing refill request to provider for review/approval because:  Patient needs to be seen because it has been more than 1 year since last office visit.  Lizbeth Shah RN

## 2021-05-30 ENCOUNTER — RECORDS - HEALTHEAST (OUTPATIENT)
Dept: ADMINISTRATIVE | Facility: CLINIC | Age: 51
End: 2021-05-30

## 2021-06-06 ENCOUNTER — HEALTH MAINTENANCE LETTER (OUTPATIENT)
Age: 51
End: 2021-06-06

## 2021-06-08 DIAGNOSIS — E78.5 HYPERLIPIDEMIA LDL GOAL <160: ICD-10-CM

## 2021-06-10 ENCOUNTER — OFFICE VISIT (OUTPATIENT)
Dept: FAMILY MEDICINE | Facility: CLINIC | Age: 51
End: 2021-06-10
Payer: COMMERCIAL

## 2021-06-10 VITALS
HEART RATE: 106 BPM | WEIGHT: 198.6 LBS | HEIGHT: 65 IN | SYSTOLIC BLOOD PRESSURE: 139 MMHG | TEMPERATURE: 98.3 F | DIASTOLIC BLOOD PRESSURE: 86 MMHG | BODY MASS INDEX: 33.09 KG/M2 | OXYGEN SATURATION: 95 %

## 2021-06-10 DIAGNOSIS — F32.5 MAJOR DEPRESSION IN COMPLETE REMISSION (H): ICD-10-CM

## 2021-06-10 DIAGNOSIS — E78.5 HYPERLIPIDEMIA LDL GOAL <160: ICD-10-CM

## 2021-06-10 DIAGNOSIS — Z00.00 ROUTINE GENERAL MEDICAL EXAMINATION AT A HEALTH CARE FACILITY: Primary | ICD-10-CM

## 2021-06-10 DIAGNOSIS — G44.209 TENSION HEADACHE: ICD-10-CM

## 2021-06-10 DIAGNOSIS — M62.830 LUMBAR PARASPINAL MUSCLE SPASM: ICD-10-CM

## 2021-06-10 DIAGNOSIS — G47.00 INSOMNIA, UNSPECIFIED TYPE: ICD-10-CM

## 2021-06-10 DIAGNOSIS — Z12.31 ENCOUNTER FOR SCREENING MAMMOGRAM FOR MALIGNANT NEOPLASM OF BREAST: ICD-10-CM

## 2021-06-10 DIAGNOSIS — J30.1 SEASONAL ALLERGIC RHINITIS DUE TO POLLEN: ICD-10-CM

## 2021-06-10 DIAGNOSIS — Z12.11 SCREEN FOR COLON CANCER: ICD-10-CM

## 2021-06-10 DIAGNOSIS — G43.109 MIGRAINE WITH AURA AND WITHOUT STATUS MIGRAINOSUS, NOT INTRACTABLE: ICD-10-CM

## 2021-06-10 LAB
ALBUMIN SERPL-MCNC: 3.9 G/DL (ref 3.4–5)
ALP SERPL-CCNC: 64 U/L (ref 40–150)
ALT SERPL W P-5'-P-CCNC: 30 U/L (ref 0–50)
ANION GAP SERPL CALCULATED.3IONS-SCNC: 3 MMOL/L (ref 3–14)
AST SERPL W P-5'-P-CCNC: 19 U/L (ref 0–45)
BILIRUB SERPL-MCNC: 0.3 MG/DL (ref 0.2–1.3)
BUN SERPL-MCNC: 14 MG/DL (ref 7–30)
CALCIUM SERPL-MCNC: 8.7 MG/DL (ref 8.5–10.1)
CHLORIDE SERPL-SCNC: 107 MMOL/L (ref 94–109)
CHOLEST SERPL-MCNC: 209 MG/DL
CO2 SERPL-SCNC: 30 MMOL/L (ref 20–32)
CREAT SERPL-MCNC: 0.94 MG/DL (ref 0.52–1.04)
GFR SERPL CREATININE-BSD FRML MDRD: 70 ML/MIN/{1.73_M2}
GLUCOSE SERPL-MCNC: 104 MG/DL (ref 70–99)
HBA1C MFR BLD: 5.7 % (ref 0–5.6)
HDLC SERPL-MCNC: 56 MG/DL
LDLC SERPL CALC-MCNC: 128 MG/DL
NONHDLC SERPL-MCNC: 153 MG/DL
POTASSIUM SERPL-SCNC: 4.2 MMOL/L (ref 3.4–5.3)
PROT SERPL-MCNC: 7.3 G/DL (ref 6.8–8.8)
SODIUM SERPL-SCNC: 140 MMOL/L (ref 133–144)
TRIGL SERPL-MCNC: 127 MG/DL

## 2021-06-10 PROCEDURE — 90732 PPSV23 VACC 2 YRS+ SUBQ/IM: CPT | Performed by: PHYSICIAN ASSISTANT

## 2021-06-10 PROCEDURE — 99213 OFFICE O/P EST LOW 20 MIN: CPT | Mod: 25 | Performed by: PHYSICIAN ASSISTANT

## 2021-06-10 PROCEDURE — 80053 COMPREHEN METABOLIC PANEL: CPT | Performed by: PHYSICIAN ASSISTANT

## 2021-06-10 PROCEDURE — 90471 IMMUNIZATION ADMIN: CPT | Performed by: PHYSICIAN ASSISTANT

## 2021-06-10 PROCEDURE — 80061 LIPID PANEL: CPT | Performed by: PHYSICIAN ASSISTANT

## 2021-06-10 PROCEDURE — 36415 COLL VENOUS BLD VENIPUNCTURE: CPT | Performed by: PHYSICIAN ASSISTANT

## 2021-06-10 PROCEDURE — 83036 HEMOGLOBIN GLYCOSYLATED A1C: CPT | Performed by: PHYSICIAN ASSISTANT

## 2021-06-10 PROCEDURE — 99396 PREV VISIT EST AGE 40-64: CPT | Mod: 25 | Performed by: PHYSICIAN ASSISTANT

## 2021-06-10 RX ORDER — TOPIRAMATE 50 MG/1
50 TABLET, FILM COATED ORAL 2 TIMES DAILY
Qty: 180 TABLET | Refills: 3 | Status: SHIPPED | OUTPATIENT
Start: 2021-06-10

## 2021-06-10 RX ORDER — ZOLPIDEM TARTRATE 5 MG/1
TABLET ORAL
Qty: 30 TABLET | Refills: 0 | Status: CANCELLED | OUTPATIENT
Start: 2021-06-10

## 2021-06-10 RX ORDER — FLUTICASONE PROPIONATE 50 MCG
SPRAY, SUSPENSION (ML) NASAL
Qty: 16 ML | Refills: 2 | Status: SHIPPED | OUTPATIENT
Start: 2021-06-10

## 2021-06-10 RX ORDER — SIMVASTATIN 40 MG
TABLET ORAL
Qty: 90 TABLET | Refills: 0 | OUTPATIENT
Start: 2021-06-10

## 2021-06-10 RX ORDER — DULOXETIN HYDROCHLORIDE 30 MG/1
30 CAPSULE, DELAYED RELEASE ORAL 2 TIMES DAILY
Qty: 180 CAPSULE | Refills: 3 | Status: SHIPPED | OUTPATIENT
Start: 2021-06-10

## 2021-06-10 RX ORDER — BUPROPION HYDROCHLORIDE 300 MG/1
300 TABLET ORAL EVERY MORNING
Qty: 90 TABLET | Refills: 3 | Status: SHIPPED | OUTPATIENT
Start: 2021-06-10 | End: 2022-06-21

## 2021-06-10 RX ORDER — CYCLOBENZAPRINE HCL 10 MG
TABLET ORAL
Qty: 90 TABLET | Refills: 4 | Status: SHIPPED | OUTPATIENT
Start: 2021-06-10 | End: 2022-07-01

## 2021-06-10 RX ORDER — SIMVASTATIN 40 MG
40 TABLET ORAL AT BEDTIME
Qty: 90 TABLET | Refills: 3 | Status: SHIPPED | OUTPATIENT
Start: 2021-06-10 | End: 2022-06-21

## 2021-06-10 ASSESSMENT — PATIENT HEALTH QUESTIONNAIRE - PHQ9: SUM OF ALL RESPONSES TO PHQ QUESTIONS 1-9: 6

## 2021-06-10 ASSESSMENT — MIFFLIN-ST. JEOR: SCORE: 1517.97

## 2021-06-10 ASSESSMENT — ENCOUNTER SYMPTOMS
DYSURIA: 0
SORE THROAT: 0
HEADACHES: 1
EYE PAIN: 0
WEAKNESS: 0
FREQUENCY: 0
ARTHRALGIAS: 0
ABDOMINAL PAIN: 0
CONSTIPATION: 0
COUGH: 0
DIARRHEA: 0
NERVOUS/ANXIOUS: 0
DIZZINESS: 0
SHORTNESS OF BREATH: 0
HEMATURIA: 0
BREAST MASS: 0
PARESTHESIAS: 0
HEMATOCHEZIA: 0
HEARTBURN: 0
PALPITATIONS: 0
NAUSEA: 0
CHILLS: 0
MYALGIAS: 1
FEVER: 0
JOINT SWELLING: 0

## 2021-06-10 NOTE — PROGRESS NOTES
SUBJECTIVE:   CC: Gloria Mehta is an 51 year old woman who presents for preventive health visit.   Patient has been advised of split billing requirements and indicates understanding: Yes  Healthy Habits:     Getting at least 3 servings of Calcium per day:  NO    Bi-annual eye exam:  Yes    Dental care twice a year:  Yes    Sleep apnea or symptoms of sleep apnea:  Daytime drowsiness and Excessive snoring    Diet:  Regular (no restrictions)    Duration of exercise:  15-30 minutes    Taking medications regularly:  Yes    Barriers to taking medications:  None    Medication side effects:  Not applicable    PHQ-2 Total Score: 0    Additional concerns today:  No    Moving to [place] in July.   Headaches- more muscle tension headaches. Topamax helpful but more headaches now.   Takes Cyclobenzaprine 1-2 times per day rarely for muscle pains      Today's PHQ-2 Score:   PHQ-2 (  [employer]) 6/10/2021   Q1: Little interest or pleasure in doing things 0   Q2: Feeling down, depressed or hopeless 0   PHQ-2 Score 0   Q1: Little interest or pleasure in doing things Not at all   Q2: Feeling down, depressed or hopeless Not at all   PHQ-2 Score 0       Abuse: Current or Past (Physical, Sexual or Emotional) - No  Do you feel safe in your environment? Yes    Have you ever done Advance Care Planning? (For example, a Health Directive, POLST, or a discussion with a medical provider or your loved ones about your wishes): No, advance care planning information given to patient to review.  Patient declined advance care planning discussion at this time.    Social History     Tobacco Use     Smoking status: Light Tobacco Smoker     Packs/day: 0.25     Years: 8.00     Pack years: 2.00     Types: Cigarettes     Last attempt to quit: 2014     Years since quittin.4     Smokeless tobacco: Never Used     Tobacco comment: 2 cigs daily   Substance Use Topics     Alcohol use: Yes     Comment: maybe one beer a month or once every 2 months      If you drink alcohol do you typically have >3 drinks per day or >7 drinks per week? No    Alcohol Use 6/10/2021   Prescreen: >3 drinks/day or >7 drinks/week? No   Prescreen: >3 drinks/day or >7 drinks/week? -   No flowsheet data found.    Reviewed orders with patient.  Reviewed health maintenance and updated orders accordingly - Yes  Labs reviewed in EPIC    Breast Cancer Screening:  Any new diagnosis of family breast, ovarian, or bowel cancer? No    Breast CA Risk Assessment (FHS-7) 5/7/2021   Do you have a family history of breast, colon, or ovarian cancer? No / Unknown       click delete button to remove this line now  Mammogram Screening: Recommended annual mammography  Pertinent mammograms are reviewed under the imaging tab.    History of abnormal Pap smear: Status post benign hysterectomy. Health Maintenance and Surgical History updated.  PAP / HPV 10/8/2013 10/26/2010 9/21/2009   PAP NIL NIL NIL     Reviewed and updated as needed this visit by clinical staff  Tobacco  Allergies  Meds              Reviewed and updated as needed this visit by Provider                    Review of Systems   Constitutional: Negative for chills and fever.   HENT: Negative for congestion, ear pain, hearing loss and sore throat.    Eyes: Negative for pain and visual disturbance.   Respiratory: Negative for cough and shortness of breath.    Cardiovascular: Negative for chest pain, palpitations and peripheral edema.   Gastrointestinal: Negative for abdominal pain, constipation, diarrhea, heartburn, hematochezia and nausea.   Breasts:  Negative for tenderness, breast mass and discharge.   Genitourinary: Negative for dysuria, frequency, genital sores, hematuria, pelvic pain, urgency, vaginal bleeding and vaginal discharge.   Musculoskeletal: Positive for myalgias. Negative for joint swelling.   Skin: Negative for rash.   Neurological: Positive for headaches. Negative for dizziness, weakness and paresthesias.  "  Psychiatric/Behavioral: Negative for mood changes. The patient is not nervous/anxious.         OBJECTIVE:   /86   Pulse 106   Temp 98.3  F (36.8  C) (Oral)   Ht 1.645 m (5' 4.76\")   Wt 90.1 kg (198 lb 9.6 oz)   LMP 11/15/2013   SpO2 95%   BMI 33.29 kg/m    Physical Exam  GENERAL: healthy, alert and no distress  EYES: Eyes grossly normal to inspection, PERRL and conjunctivae and sclerae normal  HENT: ear canals and TM's normal, nose and mouth without ulcers or lesions  NECK: no adenopathy, no asymmetry, masses, or scars and thyroid normal to palpation  RESP: lungs clear to auscultation - no rales, rhonchi or wheezes  BREAST: normal without masses, tenderness or nipple discharge and no palpable axillary masses or adenopathy  CV: regular rate and rhythm, normal S1 S2, no S3 or S4, no murmur, click or rub, no peripheral edema and peripheral pulses strong  ABDOMEN: soft, nontender, no hepatosplenomegaly, no masses and bowel sounds normal  MS: no gross musculoskeletal defects noted, no edema  SKIN: no suspicious lesions or rashes  NEURO: Normal strength and tone, mentation intact and speech normal  PSYCH: mentation appears normal, affect normal/bright    Diagnostic Test Results:  none     ASSESSMENT/PLAN:   1. Routine general medical examination at a health care facility  - Comprehensive metabolic panel  - Hemoglobin A1c  - Lipid panel reflex to direct LDL Fasting    2. Screen for colon cancer  - COLOGUARD([employer]ES)    3. Lumbar paraspinal muscle spasm  Prn use ok  - cyclobenzaprine (FLEXERIL) 10 MG tablet; TAKE 1 TABLET(10 MG) BY MOUTH THREE TIMES DAILY AS NEEDED FOR MUSCLE SPASMS  Dispense: 90 tablet; Refill: 4    4. Hyperlipidemia LDL goal <160    - simvastatin (ZOCOR) 40 MG tablet; Take 1 tablet (40 mg) by mouth At Bedtime  Dispense: 90 tablet; Refill: 3    5. Insomnia, unspecified type  Stable without ambien at this time.     6. Tension headache  Increase topamax to 50mg BID. Call clinic if side " "effects or concerns.   - topiramate (TOPAMAX) 50 MG tablet; Take 1 tablet (50 mg) by mouth 2 times daily  Dispense: 180 tablet; Refill: 3  - DULoxetine (CYMBALTA) 30 MG capsule; Take 1 capsule (30 mg) by mouth 2 times daily  Dispense: 180 capsule; Refill: 3    7. Migraine with aura and without status migrainosus, not intractable  - topiramate (TOPAMAX) 50 MG tablet; Take 1 tablet (50 mg) by mouth 2 times daily  Dispense: 180 tablet; Refill: 3    8. Major depression in complete remission (H)  Stable.   - buPROPion (WELLBUTRIN XL) 300 MG 24 hr tablet; Take 1 tablet (300 mg) by mouth every morning  Dispense: 90 tablet; Refill: 3    9. Seasonal allergic rhinitis due to pollen  Stable.   - fluticasone (FLONASE) 50 MCG/ACT nasal spray; SHAKE LIQUID AND USE 1 SPRAY IN EACH NOSTRIL DAILY  Dispense: 16 mL; Refill: 2    10. Encounter for screening mammogram for malignant neoplasm of breast  - MA SCREENING DIGITAL BILAT - Future  (s+30); Future    Discussed COVID-19 vaccine and encouraged patient to complete.   Patient will be moving to [place], refills provided but discussed expect patient to transition to local provider with in the year.       Patient has been advised of split billing requirements and indicates understanding: Yes  COUNSELING:  Reviewed preventive health counseling, as reflected in patient instructions       Regular exercise       Healthy diet/nutrition    Estimated body mass index is 33.29 kg/m  as calculated from the following:    Height as of this encounter: 1.645 m (5' 4.76\").    Weight as of this encounter: 90.1 kg (198 lb 9.6 oz).    Weight management plan: Discussed healthy diet and exercise guidelines    She reports that she has been smoking cigarettes. She has a 2.00 pack-year smoking history. She has never used smokeless tobacco.  Tobacco Cessation Action Plan:   Information offered: Patient not interested at this time      Counseling Resources:  ATP IV Guidelines  Pooled Cohorts Equation " Calculator  Breast Cancer Risk Calculator  BRCA-Related Cancer Risk Assessment: FHS-7 Tool  FRAX Risk Assessment  ICSI Preventive Guidelines  Dietary Guidelines for Americans, 2010  USDA's MyPlate  ASA Prophylaxis  Lung CA Screening    GINGER Mott [facility]

## 2021-06-10 NOTE — NURSING NOTE
Due to injection administration, patient instructed to remain in clinic for 15 minutes  afterwards, and to report any adverse reaction to me immediately.  VIS for PCV23 given on same date of administration.  Staff signature/Title: Shu DUTTA MA

## 2021-06-10 NOTE — PATIENT INSTRUCTIONS
1. Schedule mammogram  2. Complete the Cologuard- colon cancer screening.   3. Consider shingles vaccine  4. We are changing your topiramate to 50mg 2 times per day.       We are now scheduling all people age 12 and older for COVID-19 vaccines (patients age 12-17 can only  the Pfizer vaccine).     To schedule your appointment please log in to Between using this link to see when and where we have openings. Appointments open up throughout the week, check back for additional openings.     If there are no appointments left, you will be unable to schedule. If you have technical difficulty using Between, call [phone] for assistance.  If you would like to be added to our standby list, do that on our website: here.    Patients 12-17 years old must schedule their appointment at a location offering the Pfizer vaccine.  First dose Pfizer vaccines are available at the following sites with convenient hours, including Saturday appointments:    [facility] (former clinic, now serving as a vaccination-only site)    [facility]    Appointments for  Moderna and Desi ([employer]) COVID-19 Vaccines for those 18 years and older is available at many locations in our system.  More information is on our website: [email or web address]. Check this website to stay up to date on COVID-19 vaccination information        Patient Education     Preventive Health Recommendations  Female Ages 50 - 64    Yearly exam: See your health care provider every year in order to  o Review health changes.   o Discuss preventive care.    o Review your medicines if your doctor has prescribed any.      Get a Pap test every three years (unless you have an abnormal result and your provider advises testing more often).    If you get Pap tests with HPV test, you only need to test  every 5 years, unless you have an abnormal result.     You do not need a Pap test if your uterus was removed (hysterectomy) and you have not had cancer.    You should be tested each year for STDs (sexually transmitted diseases) if you're at risk.     Have a mammogram every 1 to 2 years.    Have a colonoscopy at age 50, or have a yearly FIT test (stool test). These exams screen for colon cancer.      Have a cholesterol test every 5 years, or more often if advised.    Have a diabetes test (fasting glucose) every three years. If you are at risk for diabetes, you should have this test more often.     If you are at risk for osteoporosis (brittle bone disease), think about having a bone density scan (DEXA).    Shots: Get a flu shot each year. Get a tetanus shot every 10 years.    Nutrition:     Eat at least 5 servings of fruits and vegetables each day.    Eat whole-grain bread, whole-wheat pasta and brown rice instead of white grains and rice.    Get adequate Calcium and Vitamin D.     Lifestyle    Exercise at least 150 minutes a week (30 minutes a day, 5 days a week). This will help you control your weight and prevent disease.    Limit alcohol to one drink per day.    No smoking.     Wear sunscreen to prevent skin cancer.     See your dentist every six months for an exam and cleaning.    See your eye doctor every 1 to 2 years.     No

## 2021-06-11 NOTE — RESULT ENCOUNTER NOTE
Alcira,     Your cholesterol did increase. It is not at a level that requires medication but working on diet and exercise changes can help improve your cholesterol. Your sugar level is also in the pre-diabetic range. Weight loss can help prevent the development of diabetes. Let me know if you have any questions.   Bessie Kiran PA-C        The 10-year ASCVD risk score (Deondre CARBALLO Jr., et al., 2013) is: 4.3%    Values used to calculate the score:      Age: 51 years      Sex: Female      Is Non- : No      Diabetic: No      Tobacco smoker: Yes      Systolic Blood Pressure: 139 mmHg      Is BP treated: No      HDL Cholesterol: 56 mg/dL      Total Cholesterol: 209 mg/dL

## 2021-06-14 ENCOUNTER — IMMUNIZATION (OUTPATIENT)
Dept: LAB | Facility: CLINIC | Age: 51
End: 2021-06-14
Payer: COMMERCIAL

## 2021-06-16 ENCOUNTER — ANCILLARY PROCEDURE (OUTPATIENT)
Dept: MAMMOGRAPHY | Facility: CLINIC | Age: 51
End: 2021-06-16
Attending: PHYSICIAN ASSISTANT
Payer: COMMERCIAL

## 2021-06-16 DIAGNOSIS — Z12.31 ENCOUNTER FOR SCREENING MAMMOGRAM FOR MALIGNANT NEOPLASM OF BREAST: ICD-10-CM

## 2021-06-16 PROCEDURE — 77067 SCR MAMMO BI INCL CAD: CPT | Mod: TC | Performed by: RADIOLOGY

## 2021-06-24 LAB — COLOGUARD-ABSTRACT: NEGATIVE

## 2021-07-05 NOTE — RESULT ENCOUNTER NOTE
Thank you for completing your Cologuard colon cancer screening test. Your test was NEGATIVE, which means you are at low risk for developing colon cancer in the next 3 years. We will repeat this test in 3 years. Please remember if you have any blood in the stool, changes in your bowel habits or other abdominal symptoms you should be seen in clinic and your risks for colon cancer re-evaluated.   Please call the clinic with any questions.   Bessie Kiran PA-C

## 2021-09-08 ENCOUNTER — TELEPHONE (OUTPATIENT)
Dept: FAMILY MEDICINE | Facility: CLINIC | Age: 51
End: 2021-09-08

## 2021-09-08 NOTE — TELEPHONE ENCOUNTER
Central Prior Authorization Team   Phone: [phone]    PA Initiation    Medication: Duloxetine  Insurance Company: [employer] ([facility]) - Phone [phone] Fax [phone]  [facility] the [id number] [place] [address] [address] [facility] [address]  Filling Pharmacy Phone: [phone]  Filling Pharmacy Fax:    Start Date: 9/8/2021

## 2021-09-08 NOTE — TELEPHONE ENCOUNTER
Prior Authorization Retail Medication Request    Medication/Dose:   ICD code (if different than what is on RX):  Tension headache [G44.209]   Previously Tried and Failed:    Rationale:      Insurance Name:  [employer]  Insurance ID:  [id number]      Pharmacy Information (if different than what is on RX)  Name:  Karla Poon  Phone:  [phone]

## 2021-09-10 NOTE — TELEPHONE ENCOUNTER
Prior Authorization Approval    Authorization Effective Date: 9/10/2021  Authorization Expiration Date: 9/8/2022  Medication: Duloxetine  Approved Dose/Quantity:    Reference #:     Insurance Company: [employer] ([facility]) - Phone [phone] Fax [phone]  Expected CoPay:       CoPay Card Available:      Foundation Assistance Needed:    Which Pharmacy is filling the prescription (Not needed for infusion/clinic administered): [id number] [address] [facility]  Pharmacy Notified: Yes  Patient Notified: Yes

## 2021-09-26 ENCOUNTER — HEALTH MAINTENANCE LETTER (OUTPATIENT)
Age: 51
End: 2021-09-26

## 2021-10-19 PROBLEM — F32.9 MAJOR DEPRESSION: Status: ACTIVE | Noted: 2020-02-25

## 2022-02-28 NOTE — TELEPHONE ENCOUNTER
Requested Prescriptions   Pending Prescriptions Disp Refills     cyclobenzaprine (FLEXERIL) 10 MG tablet [Pharmacy Med Name: CYCLOBENZAPRINE 10MG TABLETS]  Last Written Prescription Date:  11/9/17  Last Fill Quantity: 90,  # refills: 0   Last office visit: 10/23/2017 with prescribing provider:  10/23/17   Future Office Visit:     90 tablet 0     Sig: TAKE 1 TABLET(10 MG) BY MOUTH THREE TIMES DAILY AS NEEDED FOR MUSCLE SPASMS    There is no refill protocol information for this order        zolpidem (AMBIEN) 5 MG tablet [Pharmacy Med Name: ZOLPIDEM 5MG TABLETS]  Last Written Prescription Date:  1/5/18  Last Fill Quantity: 30,  # refills: 0   Last office visit: 10/23/2017 with prescribing provider:  10/23/17   Future Office Visit:     30 tablet 0     Sig: TAKE 1 TABLET BY MOUTH EVERY NIGHT AT BEDTIME AS NEEDED FOR SLEEP    There is no refill protocol information for this order           Hydroxyzine Pregnancy And Lactation Text: This medication is not safe during pregnancy and should not be taken. It is also excreted in breast milk and breast feeding isn't recommended.

## 2022-06-30 DIAGNOSIS — M62.830 LUMBAR PARASPINAL MUSCLE SPASM: ICD-10-CM

## 2022-06-30 NOTE — TELEPHONE ENCOUNTER
Requested Prescriptions   Pending Prescriptions Disp Refills     cyclobenzaprine (FLEXERIL) 10 MG tablet [Pharmacy Med Name: CYCLOBENZAPRINE 10MG TABLETS] 90 tablet 4     Sig: TAKE 1 TABLET(10 MG) BY MOUTH THREE TIMES DAILY AS NEEDED FOR MUSCLE SPASMS       There is no refill protocol information for this order        Routing refill request to provider for review/approval because:  Drug not on the [facility] refill protocol     Adrienne Johnson RN  [facility]

## 2022-07-01 RX ORDER — CYCLOBENZAPRINE HCL 10 MG
TABLET ORAL
Qty: 90 TABLET | Refills: 0 | Status: SHIPPED | OUTPATIENT
Start: 2022-07-01

## 2022-07-01 NOTE — TELEPHONE ENCOUNTER
Looks like pt is being seen in [place].  Please send to the provider she saw last.    Shirley Shaikh, DO

## 2022-08-28 ENCOUNTER — HEALTH MAINTENANCE LETTER (OUTPATIENT)
Age: 52
End: 2022-08-28

## 2023-04-23 ENCOUNTER — HEALTH MAINTENANCE LETTER (OUTPATIENT)
Age: 53
End: 2023-04-23

## 2023-09-24 ENCOUNTER — HEALTH MAINTENANCE LETTER (OUTPATIENT)
Age: 53
End: 2023-09-24

## 2024-06-28 DIAGNOSIS — Z12.11 COLON CANCER SCREENING: ICD-10-CM

## 2024-07-12 ENCOUNTER — ORDERS ONLY (AUTO-RELEASED) (OUTPATIENT)
Dept: ADMISSION | Facility: CLINIC | Age: 54
End: 2024-07-12
Payer: COMMERCIAL

## 2024-07-12 DIAGNOSIS — Z12.11 COLON CANCER SCREENING: ICD-10-CM
